# Patient Record
Sex: MALE | Race: WHITE | NOT HISPANIC OR LATINO | Employment: UNEMPLOYED | URBAN - METROPOLITAN AREA
[De-identification: names, ages, dates, MRNs, and addresses within clinical notes are randomized per-mention and may not be internally consistent; named-entity substitution may affect disease eponyms.]

---

## 2021-07-28 ENCOUNTER — OFFICE VISIT (OUTPATIENT)
Dept: FAMILY MEDICINE | Facility: CLINIC | Age: 26
End: 2021-07-28
Payer: MEDICAID

## 2021-07-28 VITALS
DIASTOLIC BLOOD PRESSURE: 79 MMHG | HEIGHT: 68 IN | SYSTOLIC BLOOD PRESSURE: 138 MMHG | BODY MASS INDEX: 25.61 KG/M2 | TEMPERATURE: 98.3 F | WEIGHT: 169 LBS | HEART RATE: 102 BPM | OXYGEN SATURATION: 97 %

## 2021-07-28 DIAGNOSIS — Z76.89 ENCOUNTER TO ESTABLISH CARE: ICD-10-CM

## 2021-07-28 DIAGNOSIS — F19.11 HX OF SUBSTANCE ABUSE (H): ICD-10-CM

## 2021-07-28 DIAGNOSIS — Z71.6 ENCOUNTER FOR SMOKING CESSATION COUNSELING: Primary | ICD-10-CM

## 2021-07-28 PROCEDURE — 99203 OFFICE O/P NEW LOW 30 MIN: CPT | Performed by: INTERNAL MEDICINE

## 2021-07-28 RX ORDER — PAROXETINE 20 MG/1
20 TABLET, FILM COATED ORAL
COMMUNITY
Start: 2021-02-15 | End: 2021-08-09

## 2021-07-28 RX ORDER — OLANZAPINE 15 MG/1
15 TABLET ORAL
COMMUNITY
Start: 2021-05-03 | End: 2021-08-09

## 2021-07-28 RX ORDER — AMITRIPTYLINE HYDROCHLORIDE 50 MG/1
50 TABLET ORAL AT BEDTIME
COMMUNITY
Start: 2021-04-15 | End: 2021-08-09

## 2021-07-28 RX ORDER — HYDROXYZINE HYDROCHLORIDE 50 MG/1
10 TABLET, FILM COATED ORAL EVERY 8 HOURS PRN
COMMUNITY
End: 2021-08-09

## 2021-07-28 ASSESSMENT — ENCOUNTER SYMPTOMS
COUGH: 0
NAUSEA: 0
FATIGUE: 0
VOMITING: 0
ABDOMINAL DISTENTION: 0
FEVER: 0
CHILLS: 0
SHORTNESS OF BREATH: 0
PALPITATIONS: 0
DIARRHEA: 0
AGITATION: 0

## 2021-07-28 ASSESSMENT — MIFFLIN-ST. JEOR: SCORE: 1716.08

## 2021-07-28 NOTE — PROGRESS NOTES
"  Assessment & Plan     Encounter for smoking cessation counseling  Patient wants to quit smoking and is asking for Chantix.  - varenicline (CHANTIX MAMADOU) 0.5 MG X 11 & 1 MG X 42 tablet; Take 0.5 mg tab daily for 3 days, THEN 0.5 mg tab twice daily for 4 days, THEN 1 mg twice daily.    Encounter to establish care  I did complete physical exam and reviewed all systems.  Patient refused to get HIV test done at this point.  We will follow up in 3 months and address screening tests.  We will also follow-up on how he is doing with substance abuse program and smoking cessation.  He will probably need a mental health specialist referral.  }  Hx of substance abuse (H)     Tobacco Cessation:   reports that he has been smoking. He has never used smokeless tobacco.  Tobacco Cessation Action Plan: Pharmacotherapies : Chantix    BMI:   Estimated body mass index is 25.7 kg/m  as calculated from the following:    Height as of this encounter: 1.727 m (5' 8\").    Weight as of this encounter: 76.7 kg (169 lb).       See Patient Instructions    Return in about 3 months (around 10/28/2021) for with me.    CLIFTON AVILES MD  [facility]    Subjective     HPI     New Patient/Transfer of Care    Chuck Neri is a 25-year-old male with history of depression, anxiety, drug abuse and smoking.  He recently moved to [place] from [place].  He is undergoing inpatient substance program in [place].  He has been smoking for 15 years 2 packs/day and wants to quit. He is asking for Chantix.  He is also on naltrexone.  He was taking meth and alcohol and he is sober since Carol [facility].  Patient wants to establish care in [place].  He is due for hepatitis C and HIV blood work but does not want to get HIV test at this point.  He denies any other medical condition he is on olanzapine, Paxil, Atarax, amitriptyline.  He does not see a mental health specialist here and would like to establish care with them.  He does not work at the moment " "and his family lives in [place].        Review of Systems   Constitutional: Negative for chills, fatigue and fever.   Respiratory: Negative for cough and shortness of breath.    Cardiovascular: Negative for chest pain and palpitations.   Gastrointestinal: Negative for abdominal distention, diarrhea, nausea and vomiting.   Psychiatric/Behavioral: Negative for agitation, mood changes and suicidal ideas.          Objective    /79 (BP Location: Right arm, Cuff Size: Adult Regular)   Pulse 102   Temp 98.3  F (36.8  C) (Tympanic)   Ht 1.727 m (5' 8\")   Wt 76.7 kg (169 lb)   SpO2 97%   BMI 25.70 kg/m    Body mass index is 25.7 kg/m .  Physical Exam  Vitals reviewed.   Constitutional:       Appearance: Normal appearance.   HENT:      Right Ear: Tympanic membrane and ear canal normal.      Left Ear: Tympanic membrane and ear canal normal.      Nose: Nose normal.      Mouth/Throat:      Mouth: Mucous membranes are moist.      Pharynx: Oropharynx is clear. Posterior oropharyngeal erythema present.   Cardiovascular:      Rate and Rhythm: Normal rate and regular rhythm.      Heart sounds: Normal heart sounds. No murmur heard.     Pulmonary:      Effort: Pulmonary effort is normal. No respiratory distress.      Breath sounds: Normal breath sounds. No wheezing.   Abdominal:      General: Abdomen is flat. There is no distension.      Palpations: Abdomen is soft.      Tenderness: There is no abdominal tenderness.   Neurological:      Mental Status: He is alert.   Psychiatric:         Mood and Affect: Mood normal.         Behavior: Behavior normal.         Thought Content: Thought content normal.      Comments: jittery          "

## 2021-08-09 ENCOUNTER — TELEPHONE (OUTPATIENT)
Dept: FAMILY MEDICINE | Facility: CLINIC | Age: 26
End: 2021-08-09

## 2021-08-09 DIAGNOSIS — F19.11 HX OF SUBSTANCE ABUSE (H): ICD-10-CM

## 2021-08-09 DIAGNOSIS — F41.9 ANXIETY: Primary | ICD-10-CM

## 2021-08-09 RX ORDER — OLANZAPINE 15 MG/1
30 TABLET ORAL AT BEDTIME
Qty: 60 TABLET | Refills: 0 | Status: SHIPPED | OUTPATIENT
Start: 2021-08-09

## 2021-08-09 RX ORDER — AMITRIPTYLINE HYDROCHLORIDE 50 MG/1
50 TABLET ORAL AT BEDTIME
Qty: 30 TABLET | Refills: 0 | Status: SHIPPED | OUTPATIENT
Start: 2021-08-09

## 2021-08-09 RX ORDER — PAROXETINE 20 MG/1
20 TABLET, FILM COATED ORAL EVERY MORNING
Qty: 30 TABLET | Refills: 0 | Status: SHIPPED | OUTPATIENT
Start: 2021-08-09

## 2021-08-09 RX ORDER — HYDROXYZINE HYDROCHLORIDE 50 MG/1
50 TABLET, FILM COATED ORAL EVERY 8 HOURS PRN
Qty: 90 TABLET | Refills: 0 | Status: SHIPPED | OUTPATIENT
Start: 2021-08-09

## 2021-08-09 RX ORDER — NALTREXONE HYDROCHLORIDE 50 MG/1
50 TABLET, FILM COATED ORAL DAILY
Qty: 60 TABLET | Refills: 0 | Status: SHIPPED | OUTPATIENT
Start: 2021-08-09 | End: 2023-05-08

## 2021-08-09 NOTE — TELEPHONE ENCOUNTER
Notified pt who will follow up and schedule psych today  Wendie Hernández, RN  [facility] [place] RN Triage Team

## 2021-08-09 NOTE — TELEPHONE ENCOUNTER
Signed pended medications. Also sent referral to mental health. Please inform the patient    Thank you,  Dr Segura

## 2021-08-09 NOTE — TELEPHONE ENCOUNTER
",    Pt is calling asking for refills of his other/mental health medications.  Reports they were sent by Dr. Arrieta, his old psychiatrist to [facility], but pt said [facility]/local MA cannot fill RX due to provider being from [place].    Did call to DR. Arrieta's clinic in [place],and verified all with Nurse Eladia there.    Made plan with pt: Pt is going to today to get appt with new psychiatry as referred by MA.      Pended the 5 prescriptions pt requested and verified all with Psych office.  nurse Eladia verified they only sent 30 days, so only pended 30 days. Added note to all that next rx from [facility]  Some need dx associated.    Re Atarax - Pt reports his instructions on bottle are \"take 50mg three times daily as directed.\"  Had discussion with pt about use of atarax, that this is normally PRN to be used sparingly. Pt reports he has been taking 2-3 times a day per direction from the other provider. Did verify that he was prescribed 50mg tid scheduled.      Previously seeing:  Dr. Arrieta [phone]  [facility] in [facility]    Please advise if temporary RX's will be filled,  Wendie Hernández, RN  [facility] [place] [facility] RN Triage Team        "

## 2021-09-24 ENCOUNTER — HOSPITAL ENCOUNTER (EMERGENCY)
Facility: CLINIC | Age: 26
Discharge: HOME | End: 2021-09-24
Attending: PHYSICIAN ASSISTANT | Admitting: PHYSICIAN ASSISTANT
Payer: MEDICAID

## 2021-09-24 VITALS
SYSTOLIC BLOOD PRESSURE: 152 MMHG | HEIGHT: 68 IN | TEMPERATURE: 98 F | RESPIRATION RATE: 18 BRPM | HEART RATE: 98 BPM | OXYGEN SATURATION: 98 % | DIASTOLIC BLOOD PRESSURE: 76 MMHG | BODY MASS INDEX: 25.7 KG/M2

## 2021-09-24 DIAGNOSIS — R68.84 JAW PAIN: ICD-10-CM

## 2021-09-24 PROCEDURE — 99283 EMERGENCY DEPT VISIT LOW MDM: CPT | Mod: 25

## 2021-09-24 PROCEDURE — 64400 NJX AA&/STRD TRIGEMINAL NRV: CPT

## 2021-09-24 RX ORDER — PENICILLIN V POTASSIUM 500 MG/1
500 TABLET, FILM COATED ORAL 4 TIMES DAILY
Qty: 28 TABLET | Refills: 0 | Status: SHIPPED | OUTPATIENT
Start: 2021-09-24 | End: 2021-10-01

## 2021-09-24 RX ORDER — BUPIVACAINE HYDROCHLORIDE AND EPINEPHRINE 5; 5 MG/ML; UG/ML
1.8 INJECTION, SOLUTION PERINEURAL ONCE
Status: DISCONTINUED | OUTPATIENT
Start: 2021-09-24 | End: 2021-09-24 | Stop reason: HOSPADM

## 2021-09-24 NOTE — ED TRIAGE NOTES
Pt has had a cracked bottom left back tooth for a while. Started to hurt yesterday. Tried to get into emergency dentist.

## 2021-09-24 NOTE — ED PROVIDER NOTES
"  History     Chief Complaint:  Dental Pain     HPI   Chuck Neri is a 26 year old male who presents for evaluation of dental pain.  The patient states he has had a cracked tooth for many years in the left lower second molar which has not bothered him.  He states that last night for some reason it started bothering him, and was worsened throughout the day, prompting his evaluation.  He states it is worse when he puts pressure on the area, but states he has constant pain.  He does have a dental appointment scheduled for tomorrow morning at 9 AM, but states the pain has been unbearable and is looking for something for him to get through until the morning.  He denies any fever, or trouble swallowing/breathing.    Review of Systems   Constitutional: Negative for fever.   HENT: Positive for dental problem.         No trouble breathing or swallowing.   All other systems reviewed and are negative.    Allergies:  Quetiapine    Medications:  Sucralfate  Elavil  Hydroxyzine  Naltrexone  Zyprexa  Paxil  Chantix  Ativan  Pantoprazole    Past Medical History:    Methamphetamine abuse  Depression  Anxiety  Suicide attempt    Social History:  Patient presents to the ED alone.  Patient denies drug use.    Physical Exam     Patient Vitals for the past 24 hrs:   BP Temp Temp src Pulse Resp SpO2 Height   09/24/21 1704 (!) 152/76 98  F (36.7  C) Temporal 98 18 98 % 1.727 m (5' 8\")       Physical Exam  General: Appears uncomfortable. Alert.  Head:  The scalp, face, and head appear normal   Eyes:  Conjunctivae and sclerae are normal    ENT:    The oropharynx is normal     Uvula is in the midline       Structures are symmetric. No tonsillar hypertrophy or exudate.     There is a cracked tooth noted to the left lower second molar.  There is no fluctuance or surrounding swelling to suggest abscess that is amendable to drainage at this time.  No trismus.  There is tenderness surrounding the left lower second upon percussion/palpation.  No " deep space infection.  Neck:  No lymphadenopathy   CV:  Regular rate and rhythm     Normal S1/S2   Resp:  Lungs are clear to auscultation    Non-labored    No rales or wheezing   MS:  Normal muscular tone   Skin:  No rash or acute skin lesions noted   Neuro: Speech is normal and fluent.     Emergency Department Course       Imaging:  No orders to display       Procedures  PROCEDURE: Dental Block    LOCATION: left inferior alveolar    ANESTHESIA: Regional block using 1.8 cc of Marcaine.     PROCEDURE NOTE: The patient tolerated the procedure well with good relief of his discomfort  and there were no complications.      Emergency Department Course:    Reviewed:  I reviewed nursing notes, vitals, past medical history, and care everywhere    Assessments:  I obtained history and examined the patient as noted above.   I rechecked the patient and explained findings.   Dental block was performed as above.    Consults:   none    Interventions:  Medications - No data to display      Disposition:  The patient was discharged to home.     Impression & Plan     Medical Decision Making:  Chuck Neri is a 26 year old male who presents for evaluation of jaw pain. Please refer to the HPI for full details. This appears to be secondary to a dental issue as there is a broken tooth noted to the left lower second molar.  There is tenderness to percussion. There is no abscess detected around the tooth amenable to incision and drainage. The differential diagnosis includes: pulpitis, sub-apical abscess, facial cellulitis, amongst others. There is no evidence of deep space infections, significant facial swelling, Lemierre's Syndrome or Aquilino's angina. There are no posterior pharyngeal space (RPA, PTA) infections detected. Dental block was performed with relief of discomfort. I think he is safe to be discharged home. Importance of close dental follow up was encouraged and understood.  The patient does have an emergency dental appointment at  9 AM tomorrow.  Did encourage he attend this appointment.  Penicillin was prescribed.  He will take Tylenol and ibuprofen for pain.  Red flag symptoms, and reasons for return were discussed and understood. All questions were answered prior to discharge. The patient understands and agrees to this plan.      Diagnosis:    ICD-10-CM    1. Jaw pain  R68.84        Discharge Medications:  Discharge Medication List as of 9/24/2021  5:54 PM      START taking these medications    Details   penicillin V (VEETID) 500 MG tablet Take 1 tablet (500 mg) by mouth 4 times daily for 7 days, Disp-28 tablet, R-0, E-Prescribe             Scribe Disclosure:  I, Cathie Brewer, am serving as a scribe at 5:16 PM on 9/24/2021 to document services personally performed by Kathy Watters PA-C* based on my observations and the provider's statements to me.       Kathy Watters PA-C  09/25/21 0008

## 2021-09-25 ASSESSMENT — ENCOUNTER SYMPTOMS: FEVER: 0

## 2021-09-27 ENCOUNTER — PATIENT OUTREACH (OUTPATIENT)
Dept: FAMILY MEDICINE | Facility: CLINIC | Age: 26
End: 2021-09-27

## 2021-09-27 NOTE — TELEPHONE ENCOUNTER
What type of discharge? Emergency Department  Risk of Hospital admission or ED visit: 69%  Is a TCM episode required? No  When should the patient follow up with PCP? within 30 days of discharge.    Anish Bourne RN  [facility]

## 2021-09-29 NOTE — TELEPHONE ENCOUNTER
ED / Discharge Outreach Protocol    Patient Contact    Attempt # 1    Was call answered?  No.  Left message on voicemail with information to call triage back    Anish Bourne RN  [facility]       Discharge instructions:  START taking these medications     Details   penicillin V (VEETID) 500 MG tablet Take 1 tablet (500 mg) by mouth 4 times daily for 7 days, Disp-28 tablet, R-0, E-Prescribe         1 Follow up with Dentist; as scheduled tomorrow  2 Follow up with [facility] Emergency Dept (EMERGENCY MEDICINE); If symptoms worsen

## 2021-09-30 NOTE — TELEPHONE ENCOUNTER
"ED / Discharge Outreach Protocol    Patient Contact    Attempt # 1    Was call answered?  Yes.  \"May I please speak with <patient name>\"  Is patient available?   Yes    ED/Discharge Protocol    \"Hi, my name is Arlene Chairez RN, a registered nurse, and I am calling on behalf of Dr. Segura's office at [place].  I am calling to follow up and see how things are going for you after your recent visit.\"    \"I see that you were in the (ER/UC/IP) on 9/24/21.    How are you doing now that you are home?\" ok but seeing dentist today for extraction     Is patient experiencing symptoms that may require a hospital visit?  No     Discharge Instructions    \"Let's review your discharge instructions.  What is/are the follow-up recommendations?  Pt. Response: follow up with dentist, taking abx    \"Were you instructed to make a follow-up appointment?\"  Pt. Response: No.       \"When you see the provider, I would recommend that you bring your discharge instructions with you.    Medications    \"How many new medications are you on since your hospitalization/ED visit?\"    0-1  \"How many of your current medicines changed (dose, timing, name, etc.) while you were in the hospital/ED visit?\"   0-1  \"Do you have questions about your medications?\"   No  \"Were you newly diagnosed with heart failure, COPD, diabetes or did you have a heart attack?\"   No  For patients on insulin: \"Did you start on insulin in the hospital or did you have your insulin dose changed?\"   No  Post Discharge Medication Reconciliation Status: discharge medications reconciled, continue medications without change.    Was MTM referral placed (*Make sure to put transitions as reason for referral)?   No    Call Summary    \"Do you have any questions or concerns about your condition or care plan at the moment?\"    No  Triage nurse advice given    Patient was in ER 10x in the past year (assess appropriateness of ER visits.)      \"If you have questions or things don't continue to " "improve, we encourage you contact us through the main clinic number,  ([phone]).  Even if the clinic is not open, triage nurses are available 24/7 to help you.     We would like you to know that our clinic has extended hours (provide information).  We also have urgent care (provide details on closest location and hours/contact info)\"      \"Thank you for your time and take care!\"    Arlene PENDLETON RN    "

## 2021-10-08 ENCOUNTER — HOSPITAL ENCOUNTER (EMERGENCY)
Facility: CLINIC | Age: 26
Discharge: HOME | End: 2021-10-08
Attending: EMERGENCY MEDICINE | Admitting: EMERGENCY MEDICINE
Payer: COMMERCIAL

## 2021-10-08 VITALS
DIASTOLIC BLOOD PRESSURE: 80 MMHG | HEART RATE: 111 BPM | TEMPERATURE: 98.3 F | HEIGHT: 68 IN | BODY MASS INDEX: 28.04 KG/M2 | RESPIRATION RATE: 18 BRPM | WEIGHT: 185 LBS | SYSTOLIC BLOOD PRESSURE: 135 MMHG | OXYGEN SATURATION: 99 %

## 2021-10-08 DIAGNOSIS — S30.811A ABRASION OF ABDOMINAL WALL, INITIAL ENCOUNTER: ICD-10-CM

## 2021-10-08 PROCEDURE — 99282 EMERGENCY DEPT VISIT SF MDM: CPT

## 2021-10-08 ASSESSMENT — ENCOUNTER SYMPTOMS
WOUND: 1
FEVER: 0

## 2021-10-08 ASSESSMENT — MIFFLIN-ST. JEOR: SCORE: 1783.65

## 2022-01-11 ENCOUNTER — HOSPITAL ENCOUNTER (EMERGENCY)
Facility: CLINIC | Age: 27
Discharge: HOME | End: 2022-01-12
Attending: EMERGENCY MEDICINE | Admitting: EMERGENCY MEDICINE
Payer: COMMERCIAL

## 2022-01-11 DIAGNOSIS — F15.929 METHAMPHETAMINE INTOXICATION (H): ICD-10-CM

## 2022-01-11 PROCEDURE — 96374 THER/PROPH/DIAG INJ IV PUSH: CPT

## 2022-01-11 PROCEDURE — 250N000013 HC RX MED GY IP 250 OP 250 PS 637: Performed by: EMERGENCY MEDICINE

## 2022-01-11 PROCEDURE — 99285 EMERGENCY DEPT VISIT HI MDM: CPT | Mod: 25

## 2022-01-11 PROCEDURE — 96376 TX/PRO/DX INJ SAME DRUG ADON: CPT

## 2022-01-11 RX ORDER — LORAZEPAM 1 MG/1
1 TABLET ORAL ONCE
Status: COMPLETED | OUTPATIENT
Start: 2022-01-11 | End: 2022-01-11

## 2022-01-11 RX ADMIN — LORAZEPAM 1 MG: 1 TABLET ORAL at 20:43

## 2022-01-11 ASSESSMENT — MIFFLIN-ST. JEOR: SCORE: 1760.97

## 2022-01-12 VITALS
HEIGHT: 68 IN | SYSTOLIC BLOOD PRESSURE: 117 MMHG | WEIGHT: 180 LBS | HEART RATE: 106 BPM | DIASTOLIC BLOOD PRESSURE: 72 MMHG | TEMPERATURE: 98.5 F | BODY MASS INDEX: 27.28 KG/M2 | OXYGEN SATURATION: 97 % | RESPIRATION RATE: 18 BRPM

## 2022-01-12 LAB
HOLD SPECIMEN: NORMAL

## 2022-01-12 PROCEDURE — 250N000013 HC RX MED GY IP 250 OP 250 PS 637: Performed by: EMERGENCY MEDICINE

## 2022-01-12 PROCEDURE — 250N000011 HC RX IP 250 OP 636: Performed by: EMERGENCY MEDICINE

## 2022-01-12 RX ORDER — LORAZEPAM 2 MG/ML
1 INJECTION INTRAMUSCULAR
Status: COMPLETED | OUTPATIENT
Start: 2022-01-12 | End: 2022-01-12

## 2022-01-12 RX ORDER — LORAZEPAM 2 MG/ML
2 INJECTION INTRAMUSCULAR ONCE
Status: COMPLETED | OUTPATIENT
Start: 2022-01-12 | End: 2022-01-12

## 2022-01-12 RX ORDER — OLANZAPINE 5 MG/1
5 TABLET, ORALLY DISINTEGRATING ORAL AT BEDTIME
Status: DISCONTINUED | OUTPATIENT
Start: 2022-01-12 | End: 2022-01-12

## 2022-01-12 RX ORDER — OLANZAPINE 5 MG/1
5 TABLET, ORALLY DISINTEGRATING ORAL
Status: COMPLETED | OUTPATIENT
Start: 2022-01-12 | End: 2022-01-12

## 2022-01-12 RX ORDER — OLANZAPINE 10 MG/1
10 TABLET, ORALLY DISINTEGRATING ORAL
Status: COMPLETED | OUTPATIENT
Start: 2022-01-12 | End: 2022-01-12

## 2022-01-12 RX ORDER — LORAZEPAM 1 MG/1
1 TABLET ORAL ONCE
Status: COMPLETED | OUTPATIENT
Start: 2022-01-12 | End: 2022-01-12

## 2022-01-12 RX ADMIN — OLANZAPINE 10 MG: 10 TABLET, ORALLY DISINTEGRATING ORAL at 03:37

## 2022-01-12 RX ADMIN — LORAZEPAM 2 MG: 2 INJECTION INTRAMUSCULAR; INTRAVENOUS at 05:29

## 2022-01-12 RX ADMIN — OLANZAPINE 5 MG: 5 TABLET, ORALLY DISINTEGRATING ORAL at 13:28

## 2022-01-12 RX ADMIN — LORAZEPAM 1 MG: 2 INJECTION INTRAMUSCULAR; INTRAVENOUS at 07:13

## 2022-01-12 RX ADMIN — LORAZEPAM 1 MG: 1 TABLET ORAL at 00:41

## 2022-01-12 NOTE — ED NOTES
Pt resting in bed, treolulous and anxious up being awoken.  Pt states he want to sleep more.  Plans to let pt continue to metabolize until appropriate to return to sober house.  Pt admits to using meth yesterday afternoon.

## 2022-01-12 NOTE — ED PROVIDER NOTES
Patient signed out to me pending sober discharge back to sober house    Patient used meth and requiring po ativan    Hopefully discharge once sober from meth    Patient given oral zyprexa and still awake, pacing    Nurse will place IV and given IV ativan    Pt signed out to Dr. Hernandez pending re-evaluation     Terri Almaguer MD  01/12/22 0585

## 2022-01-12 NOTE — ED NOTES
Pt wants doctor to talk to mom.  Also, doesn't want to change into clothes or give up any of his belongings.  I explained that all his belongings are locked up in a secured locker designated to him while he is staying here.  He stated he doesn't want to stay here then if he needs to change or have his belongings taken from him.

## 2022-01-12 NOTE — ED NOTES
Pt repositions independently, mother called, pt given the message.  Will provide phone when more sober

## 2022-01-12 NOTE — ED NOTES
Gave patient a small size in his scrubs, so they stop falling down.  Also, gave him remote for the TV

## 2022-01-12 NOTE — ED NOTES
Pt given clothing and personal items as well as discharge instructions.  Staff coming from sober house to  pt.

## 2022-01-12 NOTE — ED NOTES
The writer spoke to Sang, the manager of the pt's [facility].  Sang was agreeable to the pt coming back and stated he would sent staff to get the pt.

## 2022-01-12 NOTE — ED NOTES
Pt resting, repositions independently with intermittent involuntary outbursts of twitching and fidgety movements.  Tweaking and groaning

## 2022-01-12 NOTE — ED NOTES
Patient changed into scrub tops and bottoms. Belongings locked in  1 locker. Food and fluids provided. Patient talking on phone with mother. Patient with jerking body movements, pressured speech. Discussed with pt he is on a REILLY. Patient fearful of getting locked into seclusion.

## 2022-01-12 NOTE — ED NOTES
Pt awake independently.  Continues to have tweaking gestures of arms, feel and head.  Given breakfast tray

## 2022-01-12 NOTE — ED PROVIDER NOTES
I received patient in signout from Dr. Torres who had received signout from Dr. Sánchez,.  Please refer to their complete H&P for further information.  Briefly, patient came with altered mental status and stroke strange behavior.  He was agitated.  All of this was secondary to meth intoxication. At time of signout, sober reevaluation was pending.     Patient is significantly improved.  He is able to converse and ambulate without difficulty.  The sober house was contacted and there willing and able to have the patient come back to them.  The patient was in agreement with this plan.  We actually arrange for transportation for him.  The patient will be discharged home to his sober house.     Sandra Hernandez MD  01/12/22 3695
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
## 2022-01-13 ENCOUNTER — PATIENT OUTREACH (OUTPATIENT)
Dept: FAMILY MEDICINE | Facility: CLINIC | Age: 27
End: 2022-01-13
Payer: COMMERCIAL

## 2022-01-13 NOTE — TELEPHONE ENCOUNTER
Patient Contact    Attempt # 1    Was call answered?  No.  Left message on voicemail with information to call back.    Arlene PENDLETON, Triage RN  [facility]

## 2022-01-13 NOTE — TELEPHONE ENCOUNTER
What type of discharge? Emergency Department  Risk of Hospital admission or ED visit: 69%  Is a TCM episode required? No  When should the patient follow up with PCP? within 30 days of discharge.    Anish Bourne RN  [facility]

## 2022-04-29 ENCOUNTER — HOSPITAL ENCOUNTER (EMERGENCY)
Facility: CLINIC | Age: 27
Discharge: HOME | End: 2022-04-29
Attending: EMERGENCY MEDICINE | Admitting: EMERGENCY MEDICINE
Payer: COMMERCIAL

## 2022-04-29 VITALS
OXYGEN SATURATION: 100 % | SYSTOLIC BLOOD PRESSURE: 132 MMHG | HEART RATE: 108 BPM | DIASTOLIC BLOOD PRESSURE: 96 MMHG | RESPIRATION RATE: 18 BRPM | TEMPERATURE: 97.4 F

## 2022-04-29 DIAGNOSIS — F29 PSYCHOSIS, UNSPECIFIED PSYCHOSIS TYPE (H): ICD-10-CM

## 2022-04-29 DIAGNOSIS — F15.10 METHAMPHETAMINE ABUSE (H): ICD-10-CM

## 2022-04-29 LAB — SARS-COV-2 RNA RESP QL NAA+PROBE: NEGATIVE

## 2022-04-29 PROCEDURE — U0003 INFECTIOUS AGENT DETECTION BY NUCLEIC ACID (DNA OR RNA); SEVERE ACUTE RESPIRATORY SYNDROME CORONAVIRUS 2 (SARS-COV-2) (CORONAVIRUS DISEASE [COVID-19]), AMPLIFIED PROBE TECHNIQUE, MAKING USE OF HIGH THROUGHPUT TECHNOLOGIES AS DESCRIBED BY CMS-2020-01-R: HCPCS | Performed by: EMERGENCY MEDICINE

## 2022-04-29 PROCEDURE — C9803 HOPD COVID-19 SPEC COLLECT: HCPCS

## 2022-04-29 PROCEDURE — 99283 EMERGENCY DEPT VISIT LOW MDM: CPT

## 2022-04-29 PROCEDURE — 250N000013 HC RX MED GY IP 250 OP 250 PS 637: Performed by: EMERGENCY MEDICINE

## 2022-04-29 RX ORDER — OLANZAPINE 10 MG/1
10 TABLET, ORALLY DISINTEGRATING ORAL ONCE
Status: COMPLETED | OUTPATIENT
Start: 2022-04-29 | End: 2022-04-29

## 2022-04-29 RX ORDER — OLANZAPINE 10 MG/1
10 TABLET, ORALLY DISINTEGRATING ORAL
Qty: 1 TABLET | Refills: 0 | Status: SHIPPED | OUTPATIENT
Start: 2022-04-29

## 2022-04-29 RX ORDER — LORAZEPAM 2 MG/1
2 TABLET ORAL ONCE
Status: COMPLETED | OUTPATIENT
Start: 2022-04-29 | End: 2022-04-29

## 2022-04-29 RX ADMIN — OLANZAPINE 10 MG: 10 TABLET, ORALLY DISINTEGRATING ORAL at 09:31

## 2022-04-29 RX ADMIN — LORAZEPAM 2 MG: 2 TABLET ORAL at 07:19

## 2022-04-29 ASSESSMENT — ENCOUNTER SYMPTOMS
PALPITATIONS: 0
HALLUCINATIONS: 1
AGITATION: 1
HYPERACTIVE: 1
FEVER: 0
NERVOUS/ANXIOUS: 1

## 2022-04-29 NOTE — ED TRIAGE NOTES
"Patient comes to triage desk and states, \" I need detox from meth again.\" Patient reports he used meth 6 hours ago, he is an IV meth user. States he has had a few months of sobriety in the last few years. Currently living in a sober house and doesn't want to get kicked out. Patient reports sores in mouth that are painful.       "

## 2022-04-29 NOTE — ED PROVIDER NOTES
History     Chief Complaint:  Addiction Problem       HPI   Chuck Neri is a 26 year old male who presents with increased fidgeting and psychosis after doing meth about 6 hours ago.  He tells me previously he has received oral Ativan for this, back in January and is curious if this can be replicated as it helped with his symptoms.  He typically does IV meth, denies any fevers or desire to hurt himself or hurt other people.  Does state he is having some psychoses, and occasionally seeing things and feels the urge to pick at his skin constantly.  Does live in a sober living facility, states he cannot go back there until he is sober.  Denies any history of trauma, no pain at this point.    ROS:  Review of Systems   Constitutional: Negative for fever.   Cardiovascular: Negative for chest pain and palpitations.   Psychiatric/Behavioral: Positive for agitation and hallucinations. Negative for self-injury and suicidal ideas. The patient is nervous/anxious and is hyperactive.    All other systems reviewed and are negative.         Allergies:  Quetiapine     Medications:    amitriptyline (ELAVIL) 50 MG tablet  hydrOXYzine (ATARAX) 50 MG tablet  naltrexone (DEPADE/REVIA) 50 MG tablet  OLANZapine (ZYPREXA) 15 MG tablet  PARoxetine (PAXIL) 20 MG tablet  varenicline (CHANTIX MAMADOU) 0.5 MG X 11 & 1 MG X 42 tablet        Past Medical History:    No past medical history on file.  Patient Active Problem List   Diagnosis     Encounter to establish care     Encounter for smoking cessation counseling     Hx of substance abuse (H)        Past Surgical History:    No past surgical history on file.     Family History:    family history is not on file.    Social History:   reports that he has been smoking cigarettes. He has been smoking about 1.00 pack per day. He has never used smokeless tobacco. He reports previous alcohol use. He reports previous drug use.  PCP: Elvira Segura     Physical Exam     Patient Vitals for the past 24 hrs:    BP Temp Temp src Pulse Resp SpO2   04/29/22 0655 130/69 97.4  F (36.3  C) Temporal 108 14 100 %        Physical Exam  Vitals: reviewed by me  General: Pt seen on [facility], pleasant, cooperative, and alert to conversation.  Intermittently getting up and down off the bed, redirectable.  Taking at fingers, appears hyperactive.  Eyes: Tracking well, clear conjunctiva BL  ENT: MMM, midline trachea.   Lungs: No tachypnea, no accessory muscle use. No respiratory distress.   CV: Rate as above  MSK: no joint effusion.  No evidence of trauma  Skin: No rash  Neuro: Clear speech and no facial droop.  Psych: Not RIS, no e/o AH/VH.  Denies suicidal ideation or homicidal ideation.  Very agitated appearing.      Emergency Department Course     Laboratory:  Labs Ordered and Resulted from Time of ED Arrival to Time of ED Departure   COVID-19 VIRUS (CORONAVIRUS) BY PCR - Normal       Result Value    SARS CoV2 PCR Negative          Emergency Department Course:    Reviewed:  I reviewed nursing notes, vitals and past medical history      Interventions:  Medications   LORazepam (ATIVAN) tablet 2 mg (2 mg Oral Given 4/29/22 0719)   OLANZapine zydis (zyPREXA) ODT tab 10 mg (10 mg Oral Given 4/29/22 0931)        Disposition:  The patient was discharged to home.     Impression & Plan      Covid-19  Chuck Neri was evaluated during a global COVID-19 pandemic, which necessitated consideration that the patient might be at risk for infection with the SARS-CoV-2 virus that causes COVID-19.   Applicable protocols for evaluation were followed during the patient's care.   COVID-19 was considered as part of the patient's evaluation. The plan for testing is:  a test was obtained during this visit    Medical Decision Making:  This is a pleasant but agitated 26-year-old male presents emergency room after doing methamphetamines, with mild psychoses.  He is aware that when he sees is not real, and states that he feels more agitated and anxious  than anything.  On my multiple reassessments, he has not responding to internal stimuli, or interacting with any fictional stimuli.  After oral Ativan, he asked for Zyprexa, and this actually helped him sleep for a while, and a couple hours after his arrival, I do think that he is stable for discharge home.  He does still have some anxiety, this is likely associated with his meth use, and we did talk about this.  He does not want to see our mental health team here in the ER, and he tells me he feels safe going back to his sober house right now.  He seems able to care for himself, does not seem to be an imminent danger to himself, no suicidal ideation or homicidal ideation noted    Diagnosis:    ICD-10-CM    1. Methamphetamine abuse (H)  F15.10    2. Psychosis, unspecified psychosis type (H)  F29         Discharge Medications:  Discharge Medication List as of 4/29/2022 12:04 PM      START taking these medications    Details   OLANZapine zydis (ZYPREXA) 10 MG ODT Take 1 tablet (10 mg) by mouth once as needed (agitation or hallucinations), Disp-1 tablet, R-0, Local Print              4/29/2022   Werner Aviles*        Werner Aviles MD  04/29/22 2561

## 2022-04-29 NOTE — ED NOTES
Patient twitching, shaking, reported being on a 5 day Methamphetamine binge, sometimes he can not stop.  Lives in a sober house and want to go back there.  Lorazepam given early.  Ate breakfast

## 2022-04-29 NOTE — DISCHARGE INSTRUCTIONS
Please stop using methamphetamines if you are able, as this is likely the cause of your hallucinations.  Please come back to the ER if the hallucinations become harmful, or tolerated do hurtful things, or if you have any desire to hurt yourself.  Please take the medication that we have given you at home later today if you need to.  Come back with any other concerns you have.

## 2022-05-02 ENCOUNTER — PATIENT OUTREACH (OUTPATIENT)
Dept: FAMILY MEDICINE | Facility: CLINIC | Age: 27
End: 2022-05-02
Payer: COMMERCIAL

## 2022-05-02 NOTE — TELEPHONE ENCOUNTER
What type of discharge? Emergency Department  Risk of Hospital admission or ED visit: 69%  Is a TCM episode required? No  When should the patient follow up with PCP? within 30 days of discharge.    Syeda Molina RN  [facility]

## 2023-02-26 ENCOUNTER — HOSPITAL ENCOUNTER (EMERGENCY)
Facility: CLINIC | Age: 28
Discharge: HOME | End: 2023-02-26
Attending: EMERGENCY MEDICINE | Admitting: EMERGENCY MEDICINE
Payer: COMMERCIAL

## 2023-02-26 ENCOUNTER — APPOINTMENT (OUTPATIENT)
Dept: GENERAL RADIOLOGY | Facility: CLINIC | Age: 28
End: 2023-02-26
Attending: EMERGENCY MEDICINE
Payer: COMMERCIAL

## 2023-02-26 VITALS
TEMPERATURE: 97 F | RESPIRATION RATE: 13 BRPM | OXYGEN SATURATION: 98 % | HEART RATE: 119 BPM | SYSTOLIC BLOOD PRESSURE: 156 MMHG | DIASTOLIC BLOOD PRESSURE: 92 MMHG

## 2023-02-26 DIAGNOSIS — M79.672 LEFT FOOT PAIN: ICD-10-CM

## 2023-02-26 DIAGNOSIS — S93.602A SPRAIN OF LEFT FOOT, INITIAL ENCOUNTER: ICD-10-CM

## 2023-02-26 PROCEDURE — 250N000013 HC RX MED GY IP 250 OP 250 PS 637: Performed by: EMERGENCY MEDICINE

## 2023-02-26 PROCEDURE — 99284 EMERGENCY DEPT VISIT MOD MDM: CPT

## 2023-02-26 PROCEDURE — 73630 X-RAY EXAM OF FOOT: CPT | Mod: LT

## 2023-02-26 RX ORDER — IBUPROFEN 600 MG/1
600 TABLET, FILM COATED ORAL ONCE
Status: COMPLETED | OUTPATIENT
Start: 2023-02-26 | End: 2023-02-26

## 2023-02-26 RX ADMIN — IBUPROFEN 600 MG: 600 TABLET ORAL at 11:31

## 2023-02-26 ASSESSMENT — ACTIVITIES OF DAILY LIVING (ADL): ADLS_ACUITY_SCORE: 35

## 2023-02-26 NOTE — ED NOTES
Pt  stated he feels better after meds - but of course he has not walked on the leg yet . I let pt know that provider will be in soon to discuss results.

## 2023-02-26 NOTE — DISCHARGE INSTRUCTIONS
*Wear post-op shoe as directed.  Use crutches with weight bearing as tolerated.  Rest, ice, elevation.  *Take medications as prescribed.  Ibuprofen and/or tylenol for pain. Continue your current medications.  *Follow-up with orthopedics in 1-2 weeks.  *Return if you become worse in any way.

## 2023-02-26 NOTE — ED PROVIDER NOTES
History     Chief Complaint:  Foot Pain       HPI   Chuck Neri is a 27 year old male no significant past medical history who comes with complaint of left-sided foot pain.  He he states that he stands on his feet a lot for work as a  and it started hurting without any trauma proximately 2 days ago.  Then he was walking on it and he felt a pop and pain got significantly worse today.  The pain is on the top of his foot.  It is primarily over the distal metatarsals of the second and third rays.  He has not had any fever.  No history of gout.  It is mildly swollen over the top of the foot.    Independent Historian:   None - Patient Only    Review of External Notes: No pertinent external Notes seen in Care Everywhere.    ROS:  Review of Systems  As noted per HPI.  Remainder of a 10 point review of systems was negative.    Allergies:  Quetiapine     Medications:    amitriptyline (ELAVIL) 50 MG tablet  hydrOXYzine (ATARAX) 50 MG tablet  naltrexone (DEPADE/REVIA) 50 MG tablet  OLANZapine (ZYPREXA) 15 MG tablet  OLANZapine zydis (ZYPREXA) 10 MG ODT  PARoxetine (PAXIL) 20 MG tablet  varenicline (CHANTIX MAMADOU) 0.5 MG X 11 & 1 MG X 42 tablet        Past Medical History:    No past medical history on file.    Past Surgical History:    No past surgical history on file.     Family History:    family history is not on file.    Social History:   reports that he has been smoking cigarettes. He has been smoking an average of 1 pack per day. He has never used smokeless tobacco. He reports that he does not currently use alcohol. He reports that he does not currently use drugs.  PCP: Elvira Segura     Physical Exam   Patient Vitals for the past 24 hrs:   BP Temp Pulse Resp SpO2   02/26/23 1129 -- 97  F (36.1  C) -- -- --   02/26/23 1128 (!) 156/92 -- 119 13 98 %        Physical Exam  General: Well-nourished, no acute distress  Eyes: PERRL, conjunctivae pink no scleral icterus or conjunctival injection  ENT:  Moist mucus  membranes  Respiratory:  No respiratory distress  CV: Normal rate.  Normal symmetric DP pulses and distal capillary refill in both feet.  Skin: Warm, dry.  No rashes or petechiae.  Does not appear to be cellulitic.  No significant warmth.  Musculoskeletal: No peripheral edema or calf tenderness.  Mildly tender over the second and third distal metatarsal on the left foot.  Subtle edema overlying this.  No tenderness over the base of the great toe.  No tenderness over the malleolus I or proximal fibula.  Neuro: Alert and oriented to person/place/time.  Normal distal sensation to light touch.  Psychiatric: Normal affect      Emergency Department Course     Imaging:  Foot XR, G/E 3 views, left   Final Result   IMPRESSION: Normal joint spaces and alignment. No fracture.         Report per radiology    Laboratory:  Labs Ordered and Resulted from Time of ED Arrival to Time of ED Departure - No data to display     Emergency Department Course & Assessments:             Interventions:  Medications   ibuprofen (ADVIL/MOTRIN) tablet 600 mg (600 mg Oral $Given 2/26/23 1131)       Independent Interpretation (X-rays, CTs, rhythm strip):  I reviewed the x-ray and interpreted it with intention to the location of tenderness.  I see no obvious fractures.    Social Determinants of Health affecting care:   None    Assessments:  I assessed patient.  I reviewed the x-ray and updated him on the results and interpretation.  He was placed in a postoperative shoe and crutches.  Discussed disposition and he was in agreement with plan.    Disposition:  The patient was discharged to home.     Impression & Plan      Medical Decision Making:  Chuck Neri is a 27 year old male   Who comes with foot pain.  He is neurovascularly intact.  I do not believe this represents arterial insufficiency.  He has no calf tenderness to suggest DVT.  Exam is not consistent with cellulitis.  It would not be classic for gout.  I suspect he has a foot sprain or may  be tendinitis.  No signs of fracture on x-ray.  He was placed in a [place] shoe and crutches for comfort.  Recommended RICE treatment.  I have asked him to follow-up with orthopedics if no improvement in the next week.  At this time, with reasonable clinical confidence, I do believe he safe for discharge home.    diagnosis:    ICD-10-CM    1. Sprain of left foot, initial encounter  S93.602A Crutches Order     [employer] DME Post-op Shoe; Left      2. Left foot pain  M79.672            Discharge Medications:  Discharge Medication List as of 2/26/2023  1:13 PM             Sandra SMITH. MD David  2/26/2023   No att. providers found        Sandra Hernandez MD  02/26/23 0698

## 2023-02-26 NOTE — ED TRIAGE NOTES
Couple days of left foot pain- denies injury. Patient states top of foot is painful.      Triage Assessment     Row Name 02/26/23 1127       Triage Assessment (Adult)    Airway WDL WDL       Respiratory WDL    Respiratory WDL WDL       Skin Circulation/Temperature WDL    Skin Circulation/Temperature WDL WDL       Cardiac WDL    Cardiac WDL WDL       Peripheral/Neurovascular WDL    Peripheral Neurovascular WDL WDL       Cognitive/Neuro/Behavioral WDL    Cognitive/Neuro/Behavioral WDL WDL

## 2023-02-27 ENCOUNTER — PATIENT OUTREACH (OUTPATIENT)
Dept: FAMILY MEDICINE | Facility: CLINIC | Age: 28
End: 2023-02-27
Payer: COMMERCIAL

## 2023-02-27 NOTE — TELEPHONE ENCOUNTER
What type of discharge? Emergency Department  Risk of Hospital admission or ED visit: 51%  Is a TCM episode required? No  When should the patient follow up with PCP? n/a    Hai Falk RN  [facility]

## 2023-03-29 ENCOUNTER — LAB (OUTPATIENT)
Dept: LAB | Facility: CLINIC | Age: 28
End: 2023-03-29
Payer: COMMERCIAL

## 2023-03-29 DIAGNOSIS — Z79.899 HIGH RISK MEDICATION USE: Primary | ICD-10-CM

## 2023-03-29 DIAGNOSIS — F22 PARANOID TYPE DELUSIONAL DISORDER (H): ICD-10-CM

## 2023-03-29 LAB
CHOLEST SERPL-MCNC: 210 MG/DL
FASTING STATUS PATIENT QL REPORTED: YES
GLUCOSE SERPL-MCNC: 100 MG/DL (ref 70–99)
HBA1C MFR BLD: 5.1 %
HDLC SERPL-MCNC: 55 MG/DL
LDLC SERPL CALC-MCNC: 126 MG/DL
NONHDLC SERPL-MCNC: 155 MG/DL
TRIGL SERPL-MCNC: 144 MG/DL

## 2023-03-29 PROCEDURE — 83036 HEMOGLOBIN GLYCOSYLATED A1C: CPT

## 2023-03-29 PROCEDURE — 82947 ASSAY GLUCOSE BLOOD QUANT: CPT

## 2023-03-29 PROCEDURE — 36415 COLL VENOUS BLD VENIPUNCTURE: CPT

## 2023-03-29 PROCEDURE — 80061 LIPID PANEL: CPT

## 2023-05-08 ENCOUNTER — OFFICE VISIT (OUTPATIENT)
Dept: FAMILY MEDICINE | Facility: CLINIC | Age: 28
End: 2023-05-08
Payer: COMMERCIAL

## 2023-05-08 ENCOUNTER — NURSE TRIAGE (OUTPATIENT)
Dept: NURSING | Facility: CLINIC | Age: 28
End: 2023-05-08

## 2023-05-08 VITALS
BODY MASS INDEX: 32.43 KG/M2 | RESPIRATION RATE: 20 BRPM | WEIGHT: 214 LBS | HEIGHT: 68 IN | HEART RATE: 109 BPM | DIASTOLIC BLOOD PRESSURE: 78 MMHG | TEMPERATURE: 98.2 F | OXYGEN SATURATION: 97 % | SYSTOLIC BLOOD PRESSURE: 126 MMHG

## 2023-05-08 DIAGNOSIS — F17.290 VAPING NICOTINE DEPENDENCE, TOBACCO PRODUCT: ICD-10-CM

## 2023-05-08 DIAGNOSIS — F10.11 HISTORY OF ALCOHOL ABUSE: ICD-10-CM

## 2023-05-08 DIAGNOSIS — F41.1 GAD (GENERALIZED ANXIETY DISORDER): ICD-10-CM

## 2023-05-08 DIAGNOSIS — Z00.00 ROUTINE GENERAL MEDICAL EXAMINATION AT A HEALTH CARE FACILITY: Primary | ICD-10-CM

## 2023-05-08 DIAGNOSIS — F33.1 MODERATE EPISODE OF RECURRENT MAJOR DEPRESSIVE DISORDER (H): ICD-10-CM

## 2023-05-08 DIAGNOSIS — F15.11 HISTORY OF METHAMPHETAMINE ABUSE (H): ICD-10-CM

## 2023-05-08 DIAGNOSIS — R00.0 TACHYCARDIA: ICD-10-CM

## 2023-05-08 DIAGNOSIS — F40.8: ICD-10-CM

## 2023-05-08 PROBLEM — F15.20 METHAMPHETAMINE USE DISORDER, SEVERE (H): Status: ACTIVE | Noted: 2021-05-30

## 2023-05-08 LAB
ALBUMIN SERPL BCG-MCNC: 4.5 G/DL (ref 3.5–5.2)
ALP SERPL-CCNC: 89 U/L (ref 40–129)
ALT SERPL W P-5'-P-CCNC: 178 U/L (ref 10–50)
ANION GAP SERPL CALCULATED.3IONS-SCNC: 11 MMOL/L (ref 7–15)
AST SERPL W P-5'-P-CCNC: 140 U/L (ref 10–50)
BILIRUB SERPL-MCNC: 0.2 MG/DL
BUN SERPL-MCNC: 8.5 MG/DL (ref 6–20)
CALCIUM SERPL-MCNC: 9.5 MG/DL (ref 8.6–10)
CHLORIDE SERPL-SCNC: 107 MMOL/L (ref 98–107)
CREAT SERPL-MCNC: 1.04 MG/DL (ref 0.67–1.17)
DEPRECATED HCO3 PLAS-SCNC: 24 MMOL/L (ref 22–29)
ERYTHROCYTE [DISTWIDTH] IN BLOOD BY AUTOMATED COUNT: 12.4 % (ref 10–15)
GFR SERPL CREATININE-BSD FRML MDRD: >90 ML/MIN/1.73M2
GLUCOSE SERPL-MCNC: 88 MG/DL (ref 70–99)
HBA1C MFR BLD: 5.3 % (ref 0–5.6)
HCT VFR BLD AUTO: 41.3 % (ref 40–53)
HGB BLD-MCNC: 14.9 G/DL (ref 13.3–17.7)
MCH RBC QN AUTO: 30.3 PG (ref 26.5–33)
MCHC RBC AUTO-ENTMCNC: 36.1 G/DL (ref 31.5–36.5)
MCV RBC AUTO: 84 FL (ref 78–100)
PLATELET # BLD AUTO: 301 10E3/UL (ref 150–450)
POTASSIUM SERPL-SCNC: 4.5 MMOL/L (ref 3.4–5.3)
PROT SERPL-MCNC: 7.2 G/DL (ref 6.4–8.3)
RBC # BLD AUTO: 4.92 10E6/UL (ref 4.4–5.9)
SODIUM SERPL-SCNC: 142 MMOL/L (ref 136–145)
WBC # BLD AUTO: 8.1 10E3/UL (ref 4–11)

## 2023-05-08 PROCEDURE — 90472 IMMUNIZATION ADMIN EACH ADD: CPT | Performed by: STUDENT IN AN ORGANIZED HEALTH CARE EDUCATION/TRAINING PROGRAM

## 2023-05-08 PROCEDURE — 99213 OFFICE O/P EST LOW 20 MIN: CPT | Mod: 25 | Performed by: STUDENT IN AN ORGANIZED HEALTH CARE EDUCATION/TRAINING PROGRAM

## 2023-05-08 PROCEDURE — 90715 TDAP VACCINE 7 YRS/> IM: CPT | Performed by: STUDENT IN AN ORGANIZED HEALTH CARE EDUCATION/TRAINING PROGRAM

## 2023-05-08 PROCEDURE — 80061 LIPID PANEL: CPT | Performed by: STUDENT IN AN ORGANIZED HEALTH CARE EDUCATION/TRAINING PROGRAM

## 2023-05-08 PROCEDURE — 99395 PREV VISIT EST AGE 18-39: CPT | Mod: 25 | Performed by: STUDENT IN AN ORGANIZED HEALTH CARE EDUCATION/TRAINING PROGRAM

## 2023-05-08 PROCEDURE — 85027 COMPLETE CBC AUTOMATED: CPT | Performed by: STUDENT IN AN ORGANIZED HEALTH CARE EDUCATION/TRAINING PROGRAM

## 2023-05-08 PROCEDURE — 84443 ASSAY THYROID STIM HORMONE: CPT | Performed by: STUDENT IN AN ORGANIZED HEALTH CARE EDUCATION/TRAINING PROGRAM

## 2023-05-08 PROCEDURE — 80053 COMPREHEN METABOLIC PANEL: CPT | Performed by: STUDENT IN AN ORGANIZED HEALTH CARE EDUCATION/TRAINING PROGRAM

## 2023-05-08 PROCEDURE — 82306 VITAMIN D 25 HYDROXY: CPT | Performed by: STUDENT IN AN ORGANIZED HEALTH CARE EDUCATION/TRAINING PROGRAM

## 2023-05-08 PROCEDURE — 36415 COLL VENOUS BLD VENIPUNCTURE: CPT | Performed by: STUDENT IN AN ORGANIZED HEALTH CARE EDUCATION/TRAINING PROGRAM

## 2023-05-08 PROCEDURE — 90471 IMMUNIZATION ADMIN: CPT | Performed by: STUDENT IN AN ORGANIZED HEALTH CARE EDUCATION/TRAINING PROGRAM

## 2023-05-08 PROCEDURE — 93005 ELECTROCARDIOGRAM TRACING: CPT | Performed by: STUDENT IN AN ORGANIZED HEALTH CARE EDUCATION/TRAINING PROGRAM

## 2023-05-08 PROCEDURE — 83036 HEMOGLOBIN GLYCOSYLATED A1C: CPT | Performed by: STUDENT IN AN ORGANIZED HEALTH CARE EDUCATION/TRAINING PROGRAM

## 2023-05-08 PROCEDURE — 90677 PCV20 VACCINE IM: CPT | Performed by: STUDENT IN AN ORGANIZED HEALTH CARE EDUCATION/TRAINING PROGRAM

## 2023-05-08 PROCEDURE — 93010 ELECTROCARDIOGRAM REPORT: CPT | Performed by: INTERNAL MEDICINE

## 2023-05-08 ASSESSMENT — ENCOUNTER SYMPTOMS
DIZZINESS: 0
EYE PAIN: 0
NAUSEA: 0
COUGH: 0
CONSTIPATION: 0
PALPITATIONS: 0
SORE THROAT: 0
FEVER: 0
ABDOMINAL PAIN: 0
WEAKNESS: 0
HEARTBURN: 0
FREQUENCY: 0
PARESTHESIAS: 0
ARTHRALGIAS: 0
JOINT SWELLING: 0
DYSURIA: 0
HEMATOCHEZIA: 0
NERVOUS/ANXIOUS: 1
HEADACHES: 0
HEMATURIA: 0
CHILLS: 0
MYALGIAS: 0
SHORTNESS OF BREATH: 0
DIARRHEA: 0

## 2023-05-08 ASSESSMENT — PATIENT HEALTH QUESTIONNAIRE - PHQ9
SUM OF ALL RESPONSES TO PHQ QUESTIONS 1-9: 7
SUM OF ALL RESPONSES TO PHQ QUESTIONS 1-9: 7
10. IF YOU CHECKED OFF ANY PROBLEMS, HOW DIFFICULT HAVE THESE PROBLEMS MADE IT FOR YOU TO DO YOUR WORK, TAKE CARE OF THINGS AT HOME, OR GET ALONG WITH OTHER PEOPLE: SOMEWHAT DIFFICULT

## 2023-05-08 ASSESSMENT — PAIN SCALES - GENERAL: PAINLEVEL: NO PAIN (0)

## 2023-05-08 NOTE — PROGRESS NOTES
SUBJECTIVE:   CC: Chuck is an 27 year old who presents for preventative health visit.        View : No data to display.            Patient has been advised of split billing requirements and indicates understanding: Yes  Healthy Habits:     Getting at least 3 servings of Calcium per day:  Yes    Bi-annual eye exam:  NO    Dental care twice a year:  Yes    Sleep apnea or symptoms of sleep apnea:  None    Diet:  Regular (no restrictions)    Frequency of exercise:  1 day/week    Duration of exercise:  30-45 minutes    Taking medications regularly:  Yes    Medication side effects:  None    PHQ-2 Total Score: 2    Additional concerns today:  Yes         Today's PHQ-2 Score:       5/8/2023    12:27 PM   PHQ-2 ( 1999 [employer])   Q1: Little interest or pleasure in doing things 1   Q2: Feeling down, depressed or hopeless 1   PHQ-2 Score 2   Q1: Little interest or pleasure in doing things Several days   Q2: Feeling down, depressed or hopeless Several days   PHQ-2 Score 2       Have you ever done Advance Care Planning? (For example, a Health Directive, POLST, or a discussion with a medical provider or your loved ones about your wishes):  Didn't discuss     Social History     Tobacco Use     Smoking status: Every Day     Packs/day: 0.50     Types: Cigarettes     Smokeless tobacco: Never   Vaping Use     Vaping status: Not on file   Substance Use Topics     Alcohol use: Not Currently           5/8/2023    12:45 PM   Alcohol Use   AUDIT SCORE  0     Last PSA: No results found for: PSA    Reviewed orders with patient. Reviewed health maintenance and updated orders accordingly - Yes  Lab work is in process  Labs reviewed in EPIC  BP Readings from Last 3 Encounters:   05/08/23 (!) 134/98   02/26/23 (!) 156/92   04/29/22 (!) 132/96    Wt Readings from Last 3 Encounters:   05/08/23 97.1 kg (214 lb)   01/11/22 81.6 kg (180 lb)   10/08/21 83.9 kg (185 lb)                  Patient Active Problem List   Diagnosis     Encounter to establish  care     Encounter for smoking cessation counseling     Hx of substance abuse (H)     Methamphetamine use disorder, severe (H)     Vaping nicotine dependence, tobacco product     GLORIA (generalized anxiety disorder)     Moderate episode of recurrent major depressive disorder (H)     Olfactory reference disorder     History of alcohol abuse     History of methamphetamine abuse (H)     Tachycardia     No past surgical history on file.    Social History     Tobacco Use     Smoking status: Every Day     Packs/day: 0.50     Types: Cigarettes     Smokeless tobacco: Never   Vaping Use     Vaping status: Not on file   Substance Use Topics     Alcohol use: Not Currently     No family history on file.      Current Outpatient Medications   Medication Sig Dispense Refill     amitriptyline (ELAVIL) 50 MG tablet Take 1 tablet (50 mg) by mouth At Bedtime 30 tablet 0     hydrOXYzine (ATARAX) 50 MG tablet Take 1 tablet (50 mg) by mouth every 8 hours as needed for anxiety 90 tablet 0     OLANZapine (ZYPREXA) 15 MG tablet Take 2 tablets (30 mg) by mouth At Bedtime 60 tablet 0     OLANZapine zydis (ZYPREXA) 10 MG ODT Take 1 tablet (10 mg) by mouth once as needed (agitation or hallucinations) 1 tablet 0     PARoxetine (PAXIL) 20 MG tablet Take 1 tablet (20 mg) by mouth every morning 30 tablet 0     Allergies   Allergen Reactions     Quetiapine Other (See Comments) and Unknown       Reviewed and updated as needed this visit by clinical staff   Tobacco  Allergies  Meds              Reviewed and updated as needed this visit by Provider                 No past medical history on file.   No past surgical history on file.    Review of Systems   Constitutional: Negative for chills and fever.   HENT: Negative for congestion, ear pain, hearing loss and sore throat.    Eyes: Negative for pain and visual disturbance.   Respiratory: Negative for cough and shortness of breath.    Cardiovascular: Negative for chest pain, palpitations and peripheral  "edema.   Gastrointestinal: Negative for abdominal pain, constipation, diarrhea, heartburn, hematochezia and nausea.   Genitourinary: Negative for dysuria, frequency, genital sores, hematuria, impotence, penile discharge and urgency.   Musculoskeletal: Negative for arthralgias, joint swelling and myalgias.   Skin: Negative for rash.   Neurological: Negative for dizziness, weakness, headaches and paresthesias.   Psychiatric/Behavioral: Negative for mood changes. The patient is nervous/anxious.      OBJECTIVE:   BP (!) 134/98 (BP Location: Right arm)   Pulse (!) 127   Temp 98.2  F (36.8  C) (Oral)   Resp 20   Ht 1.727 m (5' 8\")   Wt 97.1 kg (214 lb)   SpO2 97%   BMI 32.54 kg/m      Physical Exam  GENERAL: healthy, alert and no distress  NECK: no adenopathy, no asymmetry, masses, or scars and thyroid normal to palpation  RESP: lungs clear to auscultation - no rales, rhonchi or wheezes  CV: regular rate and rhythm,no murmur, click or rub, no peripheral edema and peripheral pulses strong  MS: no gross musculoskeletal defects noted, no edema  PSYCH: mentation appears normal, affect slightly anxious but overall appropriate, good insight, somewhat ruminating/preseverating.     ASSESSMENT/PLAN:       ICD-10-CM    1. Routine general medical examination at a health care facility  Z00.00 TSH with free T4 reflex     CBC with platelets     Hemoglobin A1c     TDAP VACCINE (Adacel, Boostrix)  [[id number]]     PNEUMOCOCCAL 20 VALENT CONJUGATE (PREVNAR 20)     Comprehensive metabolic panel (BMP + Alb, Alk Phos, ALT, AST, Total. Bili, TP)     Vitamin D Deficiency      2. Tachycardia  R00.0 EKG 12-lead, tracing only      3. History of methamphetamine abuse (H)  F15.11       4. History of alcohol abuse  F10.11       5. Olfactory reference disorder  F40.8       6. GLORIA (generalized anxiety disorder)  F41.1       7. Moderate episode of recurrent major depressive disorder (H)  F33.1       8. Vaping nicotine dependence, tobacco product  " "F17.340           Patient is a 27-year-old male with PMH of meth abuse , Delusions who presents today for an annual physical.    Patient has been sober from both meth and alcohol x1 year now.  Is living in a sober home.  Is working with a psychiatrist and a therapist.  Is going to be starting DBT soon.  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  He notes that this is the heaviest he has ever been but is working on being more active.  BMI 32.54.  Lifestyle counseling provided.  Family history of cancers: no   Eye exam/dental exam: needs updated eye exam and dentist   Alcohol use: N/A  Tobacco use/marijuana use/drug use: Is currently smoking 10 cigarettes a day and also vaping.  Is not using any marijuana or any drugs.  Is working on cessation with his therapist and psychiatrist.  Mental health: See discussion below  Vaccines: Ordered  Blood work: Ordered    Olfactory reference syndrome   Has been sober x1 year from methamphetamine use and alcohol use.  Is working with therapist/psychiatrist.  Lives at a sober house.  Since November of this year, patient has had this persistentThought that he \"smells really bad\".  He feels it gets worse in social situations.  His therapist feels like this is most likely a manifestation of his anxiety but he wanted him to get a physical to rule out secondary etiologies    Tachycardia:  Initially, patient's pulse was 127 ---> repeat  109  Unclear if this is driven by anxiety  Had EKG done in the office which showed sinus tachycardia without any ischemic changes   We will wait to see what the blood work shows.  If blood work is unremarkable, patient may benefit from low-dose metoprolol.    Patient has been advised of split billing requirements and indicates understanding: Yes      COUNSELING:   Reviewed preventive health counseling, as reflected in patient " instructions        He reports that he has been smoking cigarettes. He has been smoking an average of .5 packs per day. He has never used smokeless tobacco.  Nicotine/Tobacco Cessation Plan:   working with therapist/psychiatrist    Madison Quintero DO  [facility]

## 2023-05-09 DIAGNOSIS — R74.01 TRANSAMINITIS: Primary | ICD-10-CM

## 2023-05-09 LAB
ATRIAL RATE - MUSE: 109 BPM
CHOLEST SERPL-MCNC: 167 MG/DL
DEPRECATED CALCIDIOL+CALCIFEROL SERPL-MC: 18 UG/L (ref 20–75)
DIASTOLIC BLOOD PRESSURE - MUSE: NORMAL MMHG
HDLC SERPL-MCNC: 43 MG/DL
INTERPRETATION ECG - MUSE: NORMAL
LDLC SERPL CALC-MCNC: 102 MG/DL
NONHDLC SERPL-MCNC: 124 MG/DL
P AXIS - MUSE: 35 DEGREES
PR INTERVAL - MUSE: 136 MS
QRS DURATION - MUSE: 82 MS
QT - MUSE: 322 MS
QTC - MUSE: 433 MS
R AXIS - MUSE: -14 DEGREES
SYSTOLIC BLOOD PRESSURE - MUSE: NORMAL MMHG
T AXIS - MUSE: 55 DEGREES
TRIGL SERPL-MCNC: 110 MG/DL
TSH SERPL DL<=0.005 MIU/L-ACNC: 1.69 UIU/ML (ref 0.3–4.2)
VENTRICULAR RATE- MUSE: 109 BPM

## 2023-05-09 RX ORDER — METOPROLOL SUCCINATE 25 MG/1
25 TABLET, EXTENDED RELEASE ORAL DAILY
Qty: 90 TABLET | Refills: 0 | Status: SHIPPED | OUTPATIENT
Start: 2023-05-09

## 2023-05-09 NOTE — TELEPHONE ENCOUNTER
Patient called because he saw his ALT and AST was elevated.  He was wondering if this is something he needs to be seen in ED tonight for.      I explained no, the office will call tomorrow to go over results.    Patient states they can call him at .    Nerissa Harrington RN   05/08/23 8:33 PM  [facility] Nurse Advisor    Reason for Disposition    Caller requesting routine or non-urgent lab result    Additional Information    Negative: Lab calling with strep throat test results and triager can call in prescription    Negative: Lab calling with urinalysis test results and triager can call in prescription    Negative: Medication questions    Negative: Medication renewal and refill questions    Negative: Pre-operative or pre-procedural questions    Negative: ED call to PCP (i.e., primary care provider; doctor, NP, or PA)    Negative: Doctor (or NP/PA) call to PCP    Negative: Call about patient who is currently hospitalized    Negative: Lab or radiology calling with CRITICAL test results    Negative: [1] Follow-up call from patient regarding patient's clinical status AND [2] information urgent    Negative: [1] Caller requests to speak ONLY to PCP AND [2] URGENT question    Negative: [1] Caller requests to speak to PCP now AND [2] won't tell us reason for call  (Exception: If 10 pm to 6 am, caller must first discuss reason for the call.)    Negative: Notification of hospital admission    Negative: Notification of death    Negative: Caller requesting lab results  (Exception: Routine or non-urgent lab result.)    Negative: Lab or radiology calling with test results    Negative: [1] Follow-up call from patient regarding patient's clinical status AND [2] information NON-URGENT    Negative: [1] Caller requests to speak ONLY to PCP AND [2] NON-URGENT question    Negative: Caller requesting an appointment, triage offered and declined    Protocols used: PCP CALL - NO TRIAGE-A-

## 2023-05-09 NOTE — TELEPHONE ENCOUNTER
Pt was seen elsewhere for this, and that provider did send a result note this morning.  Closing this encounter.

## 2023-05-20 ENCOUNTER — HEALTH MAINTENANCE LETTER (OUTPATIENT)
Age: 28
End: 2023-05-20

## 2023-10-02 ENCOUNTER — HOSPITAL ENCOUNTER (EMERGENCY)
Facility: CLINIC | Age: 28
Discharge: HOME | End: 2023-10-02
Attending: EMERGENCY MEDICINE | Admitting: EMERGENCY MEDICINE
Payer: COMMERCIAL

## 2023-10-02 VITALS
WEIGHT: 180 LBS | SYSTOLIC BLOOD PRESSURE: 130 MMHG | TEMPERATURE: 99.4 F | DIASTOLIC BLOOD PRESSURE: 88 MMHG | OXYGEN SATURATION: 100 % | HEART RATE: 120 BPM | BODY MASS INDEX: 27.28 KG/M2 | RESPIRATION RATE: 20 BRPM | HEIGHT: 68 IN

## 2023-10-02 DIAGNOSIS — F15.10 METHAMPHETAMINE USE (H): ICD-10-CM

## 2023-10-02 LAB
ATRIAL RATE - MUSE: 126 BPM
DIASTOLIC BLOOD PRESSURE - MUSE: NORMAL MMHG
INTERPRETATION ECG - MUSE: NORMAL
P AXIS - MUSE: 49 DEGREES
PR INTERVAL - MUSE: 132 MS
QRS DURATION - MUSE: 78 MS
QT - MUSE: 310 MS
QTC - MUSE: 448 MS
R AXIS - MUSE: -11 DEGREES
SYSTOLIC BLOOD PRESSURE - MUSE: NORMAL MMHG
T AXIS - MUSE: 60 DEGREES
VENTRICULAR RATE- MUSE: 126 BPM

## 2023-10-02 PROCEDURE — 93005 ELECTROCARDIOGRAM TRACING: CPT

## 2023-10-02 PROCEDURE — 99283 EMERGENCY DEPT VISIT LOW MDM: CPT

## 2023-10-02 PROCEDURE — 250N000013 HC RX MED GY IP 250 OP 250 PS 637: Performed by: EMERGENCY MEDICINE

## 2023-10-02 RX ORDER — OLANZAPINE 5 MG/1
5 TABLET, ORALLY DISINTEGRATING ORAL ONCE
Status: COMPLETED | OUTPATIENT
Start: 2023-10-02 | End: 2023-10-02

## 2023-10-02 RX ORDER — LORAZEPAM 1 MG/1
1-2 TABLET ORAL
Status: COMPLETED | OUTPATIENT
Start: 2023-10-02 | End: 2023-10-02

## 2023-10-02 RX ORDER — LORAZEPAM 1 MG/1
1 TABLET ORAL
Status: DISCONTINUED | OUTPATIENT
Start: 2023-10-02 | End: 2023-10-02 | Stop reason: HOSPADM

## 2023-10-02 RX ADMIN — LORAZEPAM 1 MG: 1 TABLET ORAL at 05:03

## 2023-10-02 ASSESSMENT — ACTIVITIES OF DAILY LIVING (ADL)
ADLS_ACUITY_SCORE: 35
ADLS_ACUITY_SCORE: 35

## 2023-10-02 NOTE — ED TRIAGE NOTES
Patient requesting detox.  States clean from meth for 1.5 years.  Recently relapsed.     Triage Assessment       Row Name 10/02/23 0307       Triage Assessment (Adult)    Airway WDL WDL       Respiratory WDL    Respiratory WDL WDL       Skin Circulation/Temperature WDL    Skin Circulation/Temperature WDL WDL       Cardiac WDL    Cardiac WDL WDL       Peripheral/Neurovascular WDL    Peripheral Neurovascular WDL WDL       Cognitive/Neuro/Behavioral WDL    Cognitive/Neuro/Behavioral WDL WDL

## 2023-10-02 NOTE — ED NOTES
Resources have been given to patient for DETOX in the area.   Discharge to street/sober house, Patient and/or family questions gave been answered.  Patient stated that he is going to take a taxi/lift to [facility].

## 2023-10-02 NOTE — ED PROVIDER NOTES
I assumed care of the patient from Dr. Almaguer pending possible transfer to detox.  I was contacted by the nurse at 8 AM stating that the patient was requesting discharge to home.  Upon my reassessment he is clinically sober, denies suicidal or homicidal ideation, and is requesting discharged home.  He is not acutely holdable.  I discussed resources for his substance abuse and the patient states he has resources and will follow-up as an outpatient.  He was advised that he can return to the emergency department at any time if he needs.     Trigger, Renny Buckley MD  10/02/23 8306

## 2023-10-02 NOTE — ED NOTES
Patient has changed his mind, he now wants to leave and go back to his sober house and seek DETOX though [facility]. Team to reassess POC.

## 2023-10-02 NOTE — ED PROVIDER NOTES
"  History     Chief Complaint:  Drug / Alcohol Assessment     The history is provided by the patient.      Chuck Neri is a 28 year old male with a history of depression, suicide attempt, and methamphetamine use disorder who presents with meth intoxication, interested in detox. He reports he last used methamphetamines at 11 pm. He reports he was sober for some time and relapsed on cocaine about 1.5 months ago and on methamphetamines a few days ago. Denies suicidal ideation.    Independent Historian:   None - Patient Only    Review of External Notes:   Reviewed emergency department visit from a few years ago for similar presentation and went home after Ativan and Zyprexa    Medications:    Elavil  Atarax  Metoprolol   Zyprexa  Paxil    Past Medical History:    Methamphetamine use disorder   Generalized anxiety disorder   Depression   Suicide attempt    Physical Exam   Patient Vitals for the past 24 hrs:   BP Temp Temp src Pulse Resp SpO2 Height Weight   10/02/23 0404 -- -- -- (!) 133 -- -- -- --   10/02/23 0400 130/88 -- -- -- -- -- -- --   10/02/23 0306 129/83 99.4  F (37.4  C) Oral (!) 133 20 100 % 1.727 m (5' 8\") 81.6 kg (180 lb)        Physical Exam  General: Sitting up in bed  Eyes:  The pupils are equal and round    Conjunctivae and sclerae are normal  ENT:    Wearing a mask  Neck:  Normal range of motion  CV:  Tachycardic rate    Skin warm and well perfused   Resp:  Non labored breathing on room air    No tachypnea    No cough heard  MS:  Normal muscular tone  Skin:  No rash or acute skin lesions noted  Neuro:   Awake, alert.      Speech is normal and fluent.    Face is symmetric.     Moves all extremities equally  Psych: Restless, pacing in room    Emergency Department Course   ECG:  ECG results from 10/02/23   EKG 12-lead, tracing only     Value    Systolic Blood Pressure     Diastolic Blood Pressure     Ventricular Rate 126    Atrial Rate 126    VT Interval 132    QRS Duration 78        QTc 448    P " Axis 49    R AXIS -11    T Axis 60    Interpretation ECG      Poor data quality, interpretation may be adversely affected  Sinus tachycardia  Septal infarct (cited on or before 08-MAY-2023)  Abnormal ECG  When compared with ECG of 08-MAY-2023 13:30,  No significant change was found       Interventions:  Medications   LORazepam (ATIVAN) tablet 1 mg (has no administration in time range)   LORazepam (ATIVAN) tablet 1-2 mg (1 mg Oral $Given 10/2/23 0503)   OLANZapine zydis (zyPREXA) ODT tab 5 mg (5 mg Oral Not Given 10/2/23 0505)        Independent Interpretation (X-rays, CTs, rhythm strip):  None    Assessments/Consultations/Discussion of Management or Tests:       Social Determinants of Health affecting care:   None    Disposition:  Care of the patient was transferred to my colleague Dr. Gaspar pending possible detox placement.     Impression & Plan    Medical Decision Making:  Chcuk Neri is a 28-year-old male who presented to the emergency department with meth use.  Patient is very restless and it is clear that he has been using meth recently.  He does not want medications in the ED -had to convince him that he should take oral Ativan.  Does not want an IV or blood work.  He would like to do detox if possible.  [facility] possibly has a bed.  There is no mental health concerns.  If decides he wants to leave the emergency department, can follow-up as an outpatient.    Diagnosis:    ICD-10-CM    1. Methamphetamine use (H)  F15.10          Scribe Disclosure:  ZANA, Yuliya Root, am serving as a scribe at 4:24 AM on 10/2/2023 to document services personally performed by Terri Almaguer MD based on my observations and the provider's statements to me.     10/2/2023   Terri Almaguer MD Goertz, Maria Kristine, MD  10/02/23 3251

## 2024-07-27 ENCOUNTER — HEALTH MAINTENANCE LETTER (OUTPATIENT)
Age: 29
End: 2024-07-27

## 2024-08-08 ENCOUNTER — APPOINTMENT (OUTPATIENT)
Dept: GENERAL RADIOLOGY | Facility: CLINIC | Age: 29
End: 2024-08-08
Attending: EMERGENCY MEDICINE
Payer: COMMERCIAL

## 2024-08-08 ENCOUNTER — HOSPITAL ENCOUNTER (EMERGENCY)
Facility: CLINIC | Age: 29
Discharge: HOME | End: 2024-08-08
Attending: EMERGENCY MEDICINE | Admitting: EMERGENCY MEDICINE
Payer: COMMERCIAL

## 2024-08-08 VITALS
BODY MASS INDEX: 22.81 KG/M2 | TEMPERATURE: 98.1 F | SYSTOLIC BLOOD PRESSURE: 123 MMHG | HEART RATE: 86 BPM | DIASTOLIC BLOOD PRESSURE: 76 MMHG | OXYGEN SATURATION: 98 % | RESPIRATION RATE: 16 BRPM | WEIGHT: 150 LBS

## 2024-08-08 DIAGNOSIS — S62.306A CLOSED DISPLACED FRACTURE OF FIFTH METACARPAL BONE OF RIGHT HAND, UNSPECIFIED PORTION OF METACARPAL, INITIAL ENCOUNTER: ICD-10-CM

## 2024-08-08 PROCEDURE — 29125 APPL SHORT ARM SPLINT STATIC: CPT | Mod: RT

## 2024-08-08 PROCEDURE — 99284 EMERGENCY DEPT VISIT MOD MDM: CPT

## 2024-08-08 PROCEDURE — 73130 X-RAY EXAM OF HAND: CPT | Mod: RT

## 2024-08-08 RX ORDER — OXYCODONE HYDROCHLORIDE 5 MG/1
5 TABLET ORAL EVERY 6 HOURS PRN
Qty: 10 TABLET | Refills: 0 | Status: SHIPPED | OUTPATIENT
Start: 2024-08-08 | End: 2024-08-11

## 2024-08-08 RX ORDER — OXYCODONE AND ACETAMINOPHEN 5; 325 MG/1; MG/1
2 TABLET ORAL ONCE
Status: COMPLETED | OUTPATIENT
Start: 2024-08-08 | End: 2024-08-08

## 2024-08-08 ASSESSMENT — ACTIVITIES OF DAILY LIVING (ADL)
ADLS_ACUITY_SCORE: 33
ADLS_ACUITY_SCORE: 35

## 2024-08-08 NOTE — DISCHARGE INSTRUCTIONS

## 2024-08-08 NOTE — ED PROVIDER NOTES
Emergency Department Note      History of Present Illness     Chief Complaint:  Right hand injury    HPI   Chuck Neri is a 29 year old right-hand-dominant male  who presents emergency department for evaluation of right hand pain that started when he fell while skateboarding under the influence of alcohol on his birthday yesterday.  He has not had any pain medication but he would like some.  No longer on Suboxone.  Sober from methamphetamine and other drugs since October.  He had surgery on his left thumb at [facility] in March.  No other injuries yesterday.  Pain is greatest on the pinky side of his hand.  No open wounds.    Including orthopedic noteReview of External Notes: I personally reviewed prior records from regions earlier this year.    Past Medical History     Medical History and Problem List   Hand fracture    Medications   amitriptyline (ELAVIL) 50 MG tablet  hydrOXYzine (ATARAX) 50 MG tablet  metoprolol succinate ER (TOPROL XL) 25 MG 24 hr tablet  OLANZapine (ZYPREXA) 15 MG tablet  OLANZapine zydis (ZYPREXA) 10 MG ODT  PARoxetine (PAXIL) 20 MG tablet    Surgical History   L hand surgery  Physical Exam     Patient Vitals for the past 24 hrs:   BP Temp Temp src Pulse Resp SpO2 Weight   08/08/24 1238 -- -- -- -- 16 98 % --   08/08/24 1236 123/76 -- -- 86 -- -- --   08/08/24 0953 130/65 98.1  F (36.7  C) Temporal 104 16 98 % 68 kg (150 lb)     Physical Exam  General: Nontoxic-appearing male sitting upright in room 9  HENT: MMM, no signs of facial trauma  CV:  regular rhythm, soft compartments in RUE, cap refill normal.in R fingers, normal R radial pulse  Resp: normal effort, speaks in full phrases, no stridor, no cough observed  MSK:  Spine: nontender, FROM neck  Extremities: Moderate swelling and tenderness to the ulnar aspect of the right hand which is somewhat swollen, minimally decreased range of motion of right wrist  Skin: Pre-existing callus to the right palm without evidence of  infection, no open wounds around his most tender area  Tattoos present  Neuro: awake, alert, responds appropriately to commands, SILT all extremities, ambulatory with steady gait  Psych: cooperative      Diagnostics   Imaging   XR Hand Right G/E 3 Views   Final Result   IMPRESSION: Mildly displaced fracture of the base of the fifth   metacarpal without evidence of intra-articular extension.      Normal joint spaces and alignment.      NOTE: ABNORMAL REPORT      THE DICTATION ABOVE DESCRIBES AN ABNORMAL REPORT FOR WHICH FOLLOW-UP   IS NEEDED.             LOLA MCCLENDON DO            SYSTEM ID:  [id number]        Independent Interpretation:   I personally reviewed hand xray images, I see 5th metacarpal fx.    ED Course      Medications Administered   Medications   oxyCODONE-acetaminophen (PERCOCET) 5-325 MG per tablet 2 tablet (2 tablets Oral Not Given 8/8/24 1138)       Procedure Note - Splint  I personally applied a custom plaster ulnar guttar splint to the RUE, with the help of ERT.  I re-examined the patient after the splint was set, and the patient's neurovascular status remained unchanged and patient was comfortable.  No complications evident.  Standard splint care instructions and precautions were given.      ED Course and Discussion of Management   ED Course as of 08/08/24 1351   Thu Aug 08, 2024   1145 I rechecked patient, performed splint.   1227 I rechecked patient, discussed discharge and Rx plan.       Additional Documentation  None    Medical Decision Making / Diagnosis   Medical Decision Making:  He presents with evidence of isolated injury to his right hand, where x-ray and exam are consistent with fifth metacarpal fracture.  This is minimally displaced, nothing amenable to bedside reduction, though the rationale for splinting and follow-up with the orthopedic clinic was reviewed with him.  I explained that he may ultimately benefit from surgery but this is not emergent.  This is a closed fracture.   No signs or symptoms of additional wrist or other upper extremity fracture or other concerning trauma.  No signs of ongoing intoxication or withdrawal state.  Vital signs are good.  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  After reviewing the , and considering the fact that he ultimately stated he did drive himself here, opioids were not given in the emergency department and I prescribed a very limited course of oral opioids to use for this acutely painful condition, acknowledging his prior issues with illicit drug use and prior Suboxone use though he is not on this currently.  I made it very clear that refills are not provided from the emergency department.  He accepts this.  He was discharged in improved condition.  I will leave a message on the orthopedic follow-up line as well to facilitate outpatient follow-up in that setting through [facility] orthoped[facility].    Disposition   Discharged    Diagnosis     ICD-10-CM    1. Closed displaced fracture of fifth metacarpal bone of right hand, unspecified portion of metacarpal, initial encounter  S62.306A            Discharge Medications   Discharge Medication List as of 8/8/2024 12:35 PM        START taking these medications    Details   oxyCODONE (ROXICODONE) 5 MG tablet Take 1 tablet (5 mg) by mouth every 6 hours as needed for pain, Disp-10 tablet, R-0, E-Prescribe             8/8/2024   MD Soraida Bryant, Rodriguez Godinez MD  08/08/24 8528

## 2024-08-08 NOTE — LETTER
August 8, 2024      To Whom It May Concern:      Chuck SARAH Neri was seen in our Emergency Department today, 08/08/24.      Sincerely,        Joyce Wilcox RN

## 2024-08-09 ENCOUNTER — PATIENT OUTREACH (OUTPATIENT)
Dept: FAMILY MEDICINE | Facility: CLINIC | Age: 29
End: 2024-08-09
Payer: COMMERCIAL

## 2024-08-09 NOTE — TELEPHONE ENCOUNTER
Not an [facility] Patient     Closing encounter    Arlene PENDLETON, Triage RN  [facility]

## 2024-11-08 ENCOUNTER — HOSPITAL ENCOUNTER (EMERGENCY)
Facility: CLINIC | Age: 29
Discharge: HOME | End: 2024-11-08
Attending: EMERGENCY MEDICINE | Admitting: EMERGENCY MEDICINE
Payer: COMMERCIAL

## 2024-11-08 VITALS
DIASTOLIC BLOOD PRESSURE: 70 MMHG | TEMPERATURE: 98 F | HEART RATE: 115 BPM | OXYGEN SATURATION: 100 % | BODY MASS INDEX: 24.25 KG/M2 | RESPIRATION RATE: 18 BRPM | HEIGHT: 68 IN | WEIGHT: 160 LBS | SYSTOLIC BLOOD PRESSURE: 138 MMHG

## 2024-11-08 DIAGNOSIS — K04.7 DENTAL ABSCESS: ICD-10-CM

## 2024-11-08 PROCEDURE — 99283 EMERGENCY DEPT VISIT LOW MDM: CPT

## 2024-11-08 RX ORDER — PENICILLIN V POTASSIUM 500 MG/1
500 TABLET, FILM COATED ORAL 4 TIMES DAILY
Qty: 40 TABLET | Refills: 0 | Status: SHIPPED | OUTPATIENT
Start: 2024-11-08 | End: 2024-11-18

## 2024-11-08 ASSESSMENT — COLUMBIA-SUICIDE SEVERITY RATING SCALE - C-SSRS
2. HAVE YOU ACTUALLY HAD ANY THOUGHTS OF KILLING YOURSELF IN THE PAST MONTH?: NO
6. HAVE YOU EVER DONE ANYTHING, STARTED TO DO ANYTHING, OR PREPARED TO DO ANYTHING TO END YOUR LIFE?: NO

## 2024-11-08 ASSESSMENT — ACTIVITIES OF DAILY LIVING (ADL): ADLS_ACUITY_SCORE: 0

## 2024-11-08 NOTE — ED PROVIDER NOTES
"  Emergency Department Note      History of Present Illness     Chief Complaint   Oral Swelling      HPI   Chuck Neri is a 29 year old male presenting with oral swelling. The patient has had an abscess present under his upper lip for a week with intermittent swelling. He endorses a little yellow coloring at the start, but denies any draining.  He denies pain, but states he can feel a build up of pressure. The patient has a history of dental problems, and states that he is unable to bite down fully without feeling pain in his two front teeth.     Independent Historian   None    Review of External Notes   None    Past Medical History     Medical History and Problem List   Methamphetamine use disorder   Nicotine use disorder   Intentional overdose   Alcohol abuse   Major depression   Malignant hyperthermia   Closed displaced fracture of base of first metacarpal bone, left hand   GLORIA    Medications   Amitriptyline   Hydroxyzine   Metoprolol succinate   Naltrexone   Olanzapine   Paroxetine  Pantoprazole    Sucralfate   Quetiapine       Physical Exam     Patient Vitals for the past 24 hrs:   BP Temp Temp src Pulse Resp SpO2 Height Weight   11/08/24 0732 138/70 98  F (36.7  C) Oral 115 18 100 % 1.727 m (5' 8\") 72.6 kg (160 lb)     Physical Exam    Eye:  Pupils are equal, round, and reactive.  Extraocular movements intact.    ENT:  No rhinorrhea.  Moist mucus membranes.  Normal tongue and tonsil. Patient has poor dentition with multiple caries. There is focal swelling to the buccal gum line above tooth #8 consistent with small abscess.     Musculoskeletal:  Normal movement of all extremities without evidence for deficit.    Skin:  Warm and dry without rashes.    Neurologic:  Non-focal exam without asymmetric weakness or numbness.     Psychiatric:  Normal affect with appropriate interaction with examiner.    Diagnostics     Lab Results   Labs Ordered and Resulted from Time of ED Arrival to Time of ED Departure - No data " to display    Imaging   No orders to display         Independent Interpretation   None    ED Course      Medications Administered   Medications - No data to display    Procedures   Procedures     Discussion of Management   None    ED Course   ED Course as of 11/08/24 1339   Fri Nov 08, 2024   0009 I obtained the history and examined the patient as above.        Additional Documentation  None    Medical Decision Making / Diagnosis     CMS Diagnoses: None    MIPS   None    MDM   Chuck Neri is a 29 year old male presenting to us with intermittent swelling to the left upper gumline above tooth #8.  There is some mild palpable swelling here, though no clear evidence of a true abscess that would benefit from I&D.  The patient request to be initiated on penicillin which I feel is reasonable.  He states that he has a dentist with whom he plans to follow next week.  Otherwise, was exceedingly clear within the to be no hesitation to return to us if he develops increasing swelling, fever, pain, or other emergent concerns.    Disposition   The patient was discharged.     Diagnosis     ICD-10-CM    1. Dental abscess  K04.7            Discharge Medications   Discharge Medication List as of 11/8/2024  8:07 AM        START taking these medications    Details   penicillin V (VEETID) 500 MG tablet Take 1 tablet (500 mg) by mouth 4 times daily for 10 days., Disp-40 tablet, R-0, E-Prescribe               Scribe Disclosure:  ZANA, Phoenix Peterson, am serving as a scribe at 7:47 AM on 11/8/2024 to document services personally performed by Trierweiler, Chad A, MD based on my observations and the provider's statements to me.        Trierweiler, Chad A, MD  11/08/24 5208

## 2024-11-08 NOTE — ED TRIAGE NOTES
Intermit upper lip swelling for the past couple days.  States concern for abscess, having issues with R front tooth. Wants ABs. Denies fever.     Triage Assessment (Adult)       Row Name 11/08/24 0737          Triage Assessment    Airway WDL WDL        Respiratory WDL    Respiratory WDL WDL        Skin Circulation/Temperature WDL    Skin Circulation/Temperature WDL WDL        Cardiac WDL    Cardiac WDL WDL        Peripheral/Neurovascular WDL    Peripheral Neurovascular WDL WDL        Cognitive/Neuro/Behavioral WDL    Cognitive/Neuro/Behavioral WDL WDL

## 2024-11-08 NOTE — DISCHARGE INSTRUCTIONS
As discussed, you appear to have signs of an abscess above your front tooth.  It is imperative you see a dentist soon to have this addressed.  Take the prescribed antibiotics.  Return to the ER at any point for increasing pain, swelling, fever, or any other emergent concerns.    Discharge Instructions  Dental Pain    You have been seen today for a toothache. Your pain may be caused by an exposed nerve, an infection (pulpitis), a root abscess (pocket of pus), or other problems. You will need to see a dentist for a solution to your tooth problem. Emergency Department care is only to help control your problem until you can see a dentist; we cannot provide complete dental care.  Today, we did not find any sign that your toothache was caused by any dangerous or life-threatening condition, but sometimes symptoms develop over time and cannot be found during an emergency visit, so it is very important that you follow up with your dentist.      Generally, every Emergency Department visit should have a follow-up clinic visit with either a primary or a specialty clinic/provider. Please follow-up as instructed by your emergency provider today.    Return to the Emergency Department if:  You develop a new fever over 100.4 F.  You cannot open your mouth normally, cannot move your tongue well, or cannot swallow.  You have new or increased swelling of your face or neck.  You develop drainage of pus or foul smelling material from around your tooth.  What can I do to help myself?  Take any antibiotic the provider may have prescribed for you today.  Avoid very hot or very cold foods as both can cause pain.  Make an appointment to see a dentist as soon as possible. Dentists are generally not  on-staff  at hospitals so we cannot  refer  to you to dentist but we may be able to provide a list of dental clinics to help you.  If you were given a prescription for medicine here today, be sure to read all of the information (including the  package insert) that comes with your prescription.  This will include important information about the medicine, its side effects, and any warnings that you need to know about.  The pharmacist who fills the prescription can provide more information and answer questions you may have about the medicine.  If you have questions or concerns that the pharmacist cannot address, please call or return to the Emergency Department.   Remember that you can always come back to the Emergency Department if you are not able to see your regular provider in the amount of time listed above, if you get any new symptoms, or if there is anything that worries you.    Dental Providers    EMERGENCY DENTAL CARE    [facility]       [phone]  [facility] (Hours 9a-9p)  [phone]  [facility]    [phone]  [facility]   [phone]        REFERRALS    [facility] [facility]    [phone]  [facility]    [phone]  [facility]    [phone]        DENTAL CLINICS  Many of these clinics have reduced cost or payment plans, some take walk-ins. Call the clinic to get this information.      [facility]     [phone]  [facility]      [phone]  [facility]   [phone]   [facility]      [phone]  [facility]   [phone]  [facility]     [phone]  [facility] (appointment only)   [phone]  [facility]      [phone]  [facility]      [phone]  [facility]     [phone]  [facility]    [phone]  [facility]     [phone]  [facility]     [phone]  [facility] (free tooth pulling Monday & Wednesday) [phone]  [facility]:   Adults       [phone]   Children      [phone]   Emergency      [phone]  [facility]     [phone]  [facility]       [phone]  [facility]     [phone]

## 2025-01-04 ENCOUNTER — TELEPHONE (OUTPATIENT)
Dept: NURSING | Facility: CLINIC | Age: 30
End: 2025-01-04
Payer: COMMERCIAL

## 2025-01-04 NOTE — TELEPHONE ENCOUNTER
Telephone call  Patient calling he states he has been taking Kratom since July and he is trying to get off it.  He is feeling poorly and had ems at his house at 1 am with a bad reaction.  Transferred him to behavioral health  to discuss options of treatment.    Hillary Ellison RN   [facility] Nurse Advisor  7:25 AM 1/4/2025

## 2025-01-09 ENCOUNTER — HOSPITAL ENCOUNTER (EMERGENCY)
Facility: CLINIC | Age: 30
Discharge: HOME | End: 2025-01-09
Attending: EMERGENCY MEDICINE
Payer: COMMERCIAL

## 2025-01-09 VITALS
OXYGEN SATURATION: 100 % | BODY MASS INDEX: 25.01 KG/M2 | WEIGHT: 165 LBS | RESPIRATION RATE: 20 BRPM | SYSTOLIC BLOOD PRESSURE: 136 MMHG | TEMPERATURE: 98 F | HEART RATE: 114 BPM | HEIGHT: 68 IN | DIASTOLIC BLOOD PRESSURE: 89 MMHG

## 2025-01-09 DIAGNOSIS — R20.2 PARESTHESIA: ICD-10-CM

## 2025-01-09 DIAGNOSIS — E83.42 HYPOMAGNESEMIA: ICD-10-CM

## 2025-01-09 DIAGNOSIS — F10.90 ALCOHOL USE DISORDER: ICD-10-CM

## 2025-01-09 LAB
ALBUMIN SERPL BCG-MCNC: 4.3 G/DL (ref 3.5–5.2)
ALP SERPL-CCNC: 131 U/L (ref 40–150)
ALT SERPL W P-5'-P-CCNC: 42 U/L (ref 0–70)
ANION GAP SERPL CALCULATED.3IONS-SCNC: 12 MMOL/L (ref 7–15)
AST SERPL W P-5'-P-CCNC: 70 U/L (ref 0–45)
ATRIAL RATE - MUSE: 117 BPM
BASOPHILS # BLD AUTO: 0 10E3/UL (ref 0–0.2)
BASOPHILS NFR BLD AUTO: 1 %
BILIRUB DIRECT SERPL-MCNC: <0.2 MG/DL (ref 0–0.3)
BILIRUB SERPL-MCNC: 0.6 MG/DL
BUN SERPL-MCNC: 11.7 MG/DL (ref 6–20)
CALCIUM SERPL-MCNC: 8.9 MG/DL (ref 8.8–10.4)
CHLORIDE SERPL-SCNC: 97 MMOL/L (ref 98–107)
CREAT SERPL-MCNC: 0.85 MG/DL (ref 0.67–1.17)
DIASTOLIC BLOOD PRESSURE - MUSE: NORMAL MMHG
EGFRCR SERPLBLD CKD-EPI 2021: >90 ML/MIN/1.73M2
EOSINOPHIL # BLD AUTO: 0.1 10E3/UL (ref 0–0.7)
EOSINOPHIL NFR BLD AUTO: 2 %
ERYTHROCYTE [DISTWIDTH] IN BLOOD BY AUTOMATED COUNT: 13.1 % (ref 10–15)
ETHANOL SERPL-MCNC: 0.09 G/DL
GLUCOSE SERPL-MCNC: 96 MG/DL (ref 70–99)
HCO3 SERPL-SCNC: 28 MMOL/L (ref 22–29)
HCT VFR BLD AUTO: 40.7 % (ref 40–53)
HGB BLD-MCNC: 14.8 G/DL (ref 13.3–17.7)
HOLD SPECIMEN: NORMAL
HOLD SPECIMEN: NORMAL
IMM GRANULOCYTES # BLD: 0 10E3/UL
IMM GRANULOCYTES NFR BLD: 0 %
INTERPRETATION ECG - MUSE: NORMAL
LYMPHOCYTES # BLD AUTO: 1.8 10E3/UL (ref 0.8–5.3)
LYMPHOCYTES NFR BLD AUTO: 32 %
MAGNESIUM SERPL-MCNC: 1.5 MG/DL (ref 1.7–2.3)
MCH RBC QN AUTO: 32.5 PG (ref 26.5–33)
MCHC RBC AUTO-ENTMCNC: 36.4 G/DL (ref 31.5–36.5)
MCV RBC AUTO: 89 FL (ref 78–100)
MONOCYTES # BLD AUTO: 0.6 10E3/UL (ref 0–1.3)
MONOCYTES NFR BLD AUTO: 11 %
NEUTROPHILS # BLD AUTO: 3.1 10E3/UL (ref 1.6–8.3)
NEUTROPHILS NFR BLD AUTO: 55 %
NRBC # BLD AUTO: 0 10E3/UL
NRBC BLD AUTO-RTO: 0 /100
P AXIS - MUSE: 49 DEGREES
PLATELET # BLD AUTO: 201 10E3/UL (ref 150–450)
POTASSIUM SERPL-SCNC: 4.3 MMOL/L (ref 3.4–5.3)
PR INTERVAL - MUSE: 144 MS
PROT SERPL-MCNC: 6.6 G/DL (ref 6.4–8.3)
QRS DURATION - MUSE: 82 MS
QT - MUSE: 308 MS
QTC - MUSE: 429 MS
R AXIS - MUSE: 27 DEGREES
RBC # BLD AUTO: 4.56 10E6/UL (ref 4.4–5.9)
SODIUM SERPL-SCNC: 137 MMOL/L (ref 135–145)
SYSTOLIC BLOOD PRESSURE - MUSE: NORMAL MMHG
T AXIS - MUSE: 7 DEGREES
VENTRICULAR RATE- MUSE: 117 BPM
WBC # BLD AUTO: 5.6 10E3/UL (ref 4–11)

## 2025-01-09 PROCEDURE — 96375 TX/PRO/DX INJ NEW DRUG ADDON: CPT

## 2025-01-09 PROCEDURE — 250N000011 HC RX IP 250 OP 636: Performed by: EMERGENCY MEDICINE

## 2025-01-09 PROCEDURE — 82077 ASSAY SPEC XCP UR&BREATH IA: CPT | Performed by: EMERGENCY MEDICINE

## 2025-01-09 PROCEDURE — 258N000003 HC RX IP 258 OP 636: Performed by: EMERGENCY MEDICINE

## 2025-01-09 PROCEDURE — 99207 PR NO BILLABLE SERVICE THIS VISIT: CPT | Performed by: STUDENT IN AN ORGANIZED HEALTH CARE EDUCATION/TRAINING PROGRAM

## 2025-01-09 PROCEDURE — 96365 THER/PROPH/DIAG IV INF INIT: CPT | Mod: 59

## 2025-01-09 PROCEDURE — 93005 ELECTROCARDIOGRAM TRACING: CPT

## 2025-01-09 PROCEDURE — 83735 ASSAY OF MAGNESIUM: CPT | Performed by: EMERGENCY MEDICINE

## 2025-01-09 PROCEDURE — 250N000013 HC RX MED GY IP 250 OP 250 PS 637: Performed by: EMERGENCY MEDICINE

## 2025-01-09 PROCEDURE — 36415 COLL VENOUS BLD VENIPUNCTURE: CPT | Performed by: EMERGENCY MEDICINE

## 2025-01-09 PROCEDURE — 85025 COMPLETE CBC W/AUTO DIFF WBC: CPT | Performed by: EMERGENCY MEDICINE

## 2025-01-09 PROCEDURE — 99284 EMERGENCY DEPT VISIT MOD MDM: CPT | Mod: 25

## 2025-01-09 PROCEDURE — 82565 ASSAY OF CREATININE: CPT | Performed by: EMERGENCY MEDICINE

## 2025-01-09 PROCEDURE — 84450 TRANSFERASE (AST) (SGOT): CPT | Performed by: EMERGENCY MEDICINE

## 2025-01-09 RX ORDER — MAGNESIUM SULFATE HEPTAHYDRATE 40 MG/ML
2 INJECTION, SOLUTION INTRAVENOUS ONCE
Status: COMPLETED | OUTPATIENT
Start: 2025-01-09 | End: 2025-01-09

## 2025-01-09 RX ORDER — FOLIC ACID 1 MG/1
1 TABLET ORAL ONCE
Status: COMPLETED | OUTPATIENT
Start: 2025-01-09 | End: 2025-01-09

## 2025-01-09 RX ORDER — ONDANSETRON 2 MG/ML
4 INJECTION INTRAMUSCULAR; INTRAVENOUS ONCE
Status: COMPLETED | OUTPATIENT
Start: 2025-01-09 | End: 2025-01-09

## 2025-01-09 RX ORDER — MULTIVITAMIN,THERAPEUTIC
1 TABLET ORAL ONCE
Status: COMPLETED | OUTPATIENT
Start: 2025-01-09 | End: 2025-01-09

## 2025-01-09 RX ORDER — DIAZEPAM 10 MG/2ML
5 INJECTION, SOLUTION INTRAMUSCULAR; INTRAVENOUS ONCE
Status: COMPLETED | OUTPATIENT
Start: 2025-01-09 | End: 2025-01-09

## 2025-01-09 RX ORDER — MAGNESIUM OXIDE 400 MG/1
800 TABLET ORAL ONCE
Status: COMPLETED | OUTPATIENT
Start: 2025-01-09 | End: 2025-01-09

## 2025-01-09 RX ADMIN — ONDANSETRON 4 MG: 2 INJECTION, SOLUTION INTRAMUSCULAR; INTRAVENOUS at 04:27

## 2025-01-09 RX ADMIN — MULTIVITAMIN TABLET 1 TABLET: TABLET at 05:01

## 2025-01-09 RX ADMIN — THIAMINE HCL TAB 100 MG 100 MG: 100 TAB at 05:01

## 2025-01-09 RX ADMIN — Medication 800 MG: at 05:02

## 2025-01-09 RX ADMIN — SODIUM CHLORIDE, POTASSIUM CHLORIDE, SODIUM LACTATE AND CALCIUM CHLORIDE 1000 ML: 600; 310; 30; 20 INJECTION, SOLUTION INTRAVENOUS at 05:33

## 2025-01-09 RX ADMIN — FOLIC ACID 1 MG: 1 TABLET ORAL at 05:01

## 2025-01-09 RX ADMIN — MAGNESIUM SULFATE HEPTAHYDRATE 2 G: 40 INJECTION, SOLUTION INTRAVENOUS at 05:16

## 2025-01-09 RX ADMIN — DIAZEPAM 5 MG: 5 INJECTION INTRAMUSCULAR; INTRAVENOUS at 05:02

## 2025-01-09 ASSESSMENT — COLUMBIA-SUICIDE SEVERITY RATING SCALE - C-SSRS
2. HAVE YOU ACTUALLY HAD ANY THOUGHTS OF KILLING YOURSELF IN THE PAST MONTH?: NO
6. HAVE YOU EVER DONE ANYTHING, STARTED TO DO ANYTHING, OR PREPARED TO DO ANYTHING TO END YOUR LIFE?: NO
1. IN THE PAST MONTH, HAVE YOU WISHED YOU WERE DEAD OR WISHED YOU COULD GO TO SLEEP AND NOT WAKE UP?: NO

## 2025-01-09 ASSESSMENT — ACTIVITIES OF DAILY LIVING (ADL)
ADLS_ACUITY_SCORE: 41
ADLS_ACUITY_SCORE: 41

## 2025-01-09 NOTE — ED PROVIDER NOTES
Emergency Department Note      History of Present Illness     Chief Complaint   Alcohol Intoxication and Numbness (Right side)      HPI   Chuck Neri is a 29 year old male with history of alcohol abuse and intentional overdose who presents via EMS from home for alcohol intoxication and right-sided numbness. Patient called EMS for alcohol withdrawal and concerns for a stroke. He reports right-sided numbness shortly after waking up around 0230. Patient's last drink was around 1800 last night. He notes heavy drinking these past few months -- 0.750-1L per day. He did drink his regular amount yesterday. He also smoked kratom yesterday. He states this is not the longest he has gone without alcohol. Per EMS, BG is 118 and HR is in the 130's. Patient reports his right side is going in and out of numbness. He reports heaviness in right arm and tingling in his right cheek. He does report feeling anxious. He also reports some chest discomfort, which he states is not abnormal for him. Patient notes a similar experience of right-sided numbness about 1 week ago, which lasted approximately 2 hours, and occurred in the morning. He has a detox appointment at 0900 today that he is eager to make it to. No recent travel. He regularly takes his medications as prescribed. Patient states he usually vapes but has been smoking tobacco lately. He notes being clean off of meth for some time.     Independent Historian   None    Review of External Notes   ***    Past Medical History     Medical History and Problem List   Nicotine use disorder  Intentional overdose  Methamphetamine abuse  Alcohol abuse  Depression   Anxiety     Medications   Elavil  Atarax  Toprol  Zyprexa  Paxil  Seroquel  Risperdal  Chantix     Physical Exam     Patient Vitals for the past 24 hrs:   BP Temp Temp src Pulse Resp SpO2 Height Weight   01/09/25 0610 -- -- -- -- 20 100 % -- --   01/09/25 0605 136/89 -- -- 114 -- -- -- --   01/09/25 0555 -- -- -- 105 22 98 % --  "--   01/09/25 0550 -- -- -- 100 20 97 % -- --   01/09/25 0535 -- -- -- 105 16 97 % -- --   01/09/25 0530 (!) 141/92 -- -- 106 16 97 % -- --   01/09/25 0500 (!) 144/94 -- -- 120 22 98 % -- --   01/09/25 0430 (!) 131/96 -- -- 117 26 97 % -- --   01/09/25 0426 (!) 147/110 98  F (36.7  C) Temporal (!) 129 22 97 % 1.727 m (5' 8\") 74.8 kg (165 lb)     Physical Exam  ***    Diagnostics     Lab Results   Labs Ordered and Resulted from Time of ED Arrival to Time of ED Departure   BASIC METABOLIC PANEL - Abnormal       Result Value    Sodium 137      Potassium 4.3      Chloride 97 (*)     Carbon Dioxide (CO2) 28      Anion Gap 12      Urea Nitrogen 11.7      Creatinine 0.85      GFR Estimate >90      Calcium 8.9      Glucose 96     HEPATIC FUNCTION PANEL - Abnormal    Protein Total 6.6      Albumin 4.3      Bilirubin Total 0.6      Alkaline Phosphatase 131      AST 70 (*)     ALT 42      Bilirubin Direct <0.20     ETHYL ALCOHOL LEVEL - Abnormal    Alcohol ethyl 0.09 (*)    MAGNESIUM - Abnormal    Magnesium 1.5 (*)    CBC WITH PLATELETS AND DIFFERENTIAL    WBC Count 5.6      RBC Count 4.56      Hemoglobin 14.8      Hematocrit 40.7      MCV 89      MCH 32.5      MCHC 36.4      RDW 13.1      Platelet Count 201      % Neutrophils 55      % Lymphocytes 32      % Monocytes 11      % Eosinophils 2      % Basophils 1      % Immature Granulocytes 0      NRBCs per 100 WBC 0      Absolute Neutrophils 3.1      Absolute Lymphocytes 1.8      Absolute Monocytes 0.6      Absolute Eosinophils 0.1      Absolute Basophils 0.0      Absolute Immature Granulocytes 0.0      Absolute NRBCs 0.0       EKG   ECG results from 01/09/25   EKG 12-lead, tracing only     Value    Systolic Blood Pressure     Diastolic Blood Pressure     Ventricular Rate 117    Atrial Rate 117    NJ Interval 144    QRS Duration 82        QTc 429    P Axis 49    R AXIS 27    T Axis 7    Interpretation ECG      Sinus tachycardia  Septal infarct (cited on or before " "08-May-2023)  Abnormal ECG  When compared with ECG of 02-Oct-2023 05:55,  No significant change was found  Interpreted by Dr. Cunningham at 0440.     Independent Interpretation   {IndependentReview:785234::\"None\"}    ED Course      Medications Administered   Medications   ondansetron (ZOFRAN) injection 4 mg (4 mg Intravenous $Given 1/9/25 0427)   diazepam (VALIUM) injection 5 mg (5 mg Intravenous $Given 1/9/25 0502)   multivitamin, therapeutic (THERA-VIT) tablet 1 tablet (1 tablet Oral $Given 1/9/25 0501)   folic acid (FOLVITE) tablet 1 mg (1 mg Oral $Given 1/9/25 0501)   thiamine (B-1) tablet 100 mg (100 mg Oral $Given 1/9/25 0501)   magnesium oxide (MAG-OX) tablet 800 mg (800 mg Oral $Given 1/9/25 0502)   magnesium sulfate 2 g in 50 mL sterile water intermittent infusion (0 g Intravenous Stopped 1/9/25 0610)   lactated ringers BOLUS 1,000 mL (0 mLs Intravenous Stopped 1/9/25 0610)     Discussion of Management   None    ED Course   ED Course as of 01/09/25 0652   Thu Jan 09, 2025   0440 I obtained history and examined the patient as noted above.     0509 I spoke with stroke neurology regarding the patient.     0543 Rechecked and updated the patient. He states he is feeling much better. He does not desire to stay for the MRI and would like to leave to go to detox.       Additional Documentation  {EPPAAdditionalPhrase:[id number]::\"None\"}    Medical Decision Making / Diagnosis     CMS Diagnoses: None    MIPS       None    [facility]   Chuck Neri is a 29 year old male ***    Disposition   The patient was discharged.     Diagnosis     ICD-10-CM    1. Paresthesia  R20.2       2. Hypomagnesemia  E83.42       3. Alcohol use disorder  F10.90            Discharge Medications   Discharge Medication List as of 1/9/2025  6:11 AM            Scribe Disclosure:  Marcela SPANN, am serving as a scribe at 4:36 AM on 1/9/2025 to document services personally performed by Sang Cunningham MD based on my observations and the provider's " statements to me.      Hypomagnesemia  E83.42       3. Alcohol use disorder  F10.90            Discharge Medications   Discharge Medication List as of 1/9/2025  6:11 AM            Scribe Disclosure:  I, Marcela Zhang, am serving as a scribe at 4:36 AM on 1/9/2025 to document services personally performed by Sang Cunningham MD based on my observations and the provider's statements to me.        Sang Cunningham MD  01/11/25 3020

## 2025-01-09 NOTE — ED NOTES
Bed: ED17  Expected date: 1/9/25  Expected time: 4:15 AM  Means of arrival: Ambulance  Comments:  HEMS 451 29M alcohol withdrawal

## 2025-01-09 NOTE — CONSULTS
"  [facility]    Stroke Telephone Note    I was called by Sang Cunningham on 01/09/25 regarding patient Chuck Neri. The patient is a 29 year old man with alcohol use disorder, GLORIA, depression, coming in with c/o alcohol withdrawal and right side paresthesia in face and arm. Per patient he woke up around 2 AM and around 2:30 AM started experiencing some right face and arm tingling and felt his right arm was not moving quite right. ED exam around 4:30 AM shows no neurological deficit except for patient reporting feeling different on his right cheek.    Vitals  BP: (!) 131/96   Pulse: 117   Resp: 26   Temp: 98  F (36.7  C)   Weight: 74.8 kg (165 lb)    Imaging Findings  pending    Impression  Right face and arm paresthesia    Recommendations  - MRI Brain  - MRA Head and Neck    If no evidence of acute stroke or vessel obstruction/critical stenoses on imaging, no further stroke work up would be needed at this time.    My recommendations are based on the information provided over the phone by Chuck Neri's in-person providers. They are not intended to replace the clinical judgment of his in-person providers. I was not requested to personally see or examine the patient at this time.     Emely Moser MD  Vascular Neurology    To page me or covering stroke neurology team member, click here: [facility]COM  Choose \"On Call\" tab at top, then select \"NEUROLOGY/ALL SITES\" from middle drop-down box, press Enter, then look for \"stroke\" or \"telestroke\" for your site.   "

## 2025-01-09 NOTE — DISCHARGE INSTRUCTIONS
Your magnesium level was low this morning and you have been given some magnesium replacement.  It is important that you also eat high magnesium foods and you could consider taking a magnesium supplement for the next few days as well.  We discussed doing MRIs which you have currently declined, but if you have worsening numbness or weakness or any further concerns please return to the emergency department.

## 2025-01-09 NOTE — ED TRIAGE NOTES
Arrived via ems from home with c/o alcohol withdrawal and right side numbness in face/arm. Patient last drink was 6pm last night. BG is 118. HR 130s per ems. Patient called ems for withdrawal and to make sure not having stroke. AOX4. Calm and cooperative. Denies S.I. or H. I. Detox appointment at [place] 0900 AM today.      Triage Assessment (Adult)       Row Name 01/09/25 0417          Triage Assessment    Airway WDL WDL        Respiratory WDL    Respiratory WDL WDL        Skin Circulation/Temperature WDL    Skin Circulation/Temperature WDL WDL        Cardiac WDL    Cardiac WDL WDL        Peripheral/Neurovascular WDL    Peripheral Neurovascular WDL WDL        Cognitive/Neuro/Behavioral WDL    Cognitive/Neuro/Behavioral WDL WDL

## 2025-08-10 ENCOUNTER — HEALTH MAINTENANCE LETTER (OUTPATIENT)
Age: 30
End: 2025-08-10